# Patient Record
Sex: MALE | Race: WHITE | Employment: OTHER | URBAN - METROPOLITAN AREA
[De-identification: names, ages, dates, MRNs, and addresses within clinical notes are randomized per-mention and may not be internally consistent; named-entity substitution may affect disease eponyms.]

---

## 2023-12-04 ENCOUNTER — HOME HEALTH ADMISSION (OUTPATIENT)
Dept: HOME HEALTH SERVICES | Facility: HOME HEALTH | Age: 88
End: 2023-12-04
Payer: MEDICARE

## 2023-12-06 ENCOUNTER — HOME CARE VISIT (OUTPATIENT)
Facility: HOME HEALTH | Age: 88
End: 2023-12-06
Payer: MEDICARE

## 2023-12-06 VITALS
HEART RATE: 54 BPM | SYSTOLIC BLOOD PRESSURE: 110 MMHG | DIASTOLIC BLOOD PRESSURE: 64 MMHG | TEMPERATURE: 97.4 F | RESPIRATION RATE: 18 BRPM | OXYGEN SATURATION: 99 %

## 2023-12-06 PROCEDURE — G0299 HHS/HOSPICE OF RN EA 15 MIN: HCPCS

## 2023-12-06 ASSESSMENT — ENCOUNTER SYMPTOMS
PAIN LOCATION - PAIN QUALITY: CONSTANT
DYSPNEA ACTIVITY LEVEL: AFTER AMBULATING 10 - 20 FT

## 2023-12-06 NOTE — HOME HEALTH
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 Nursing and Physical Therapy needed for education on wound care, pressure injury prevention, medication regimen, HEP, home/equipment safety. Clinical Assessment/Skilled reason for admission to home health (What this means for the patient overall and need for ongoing skilled care): Riaz Celaya is a 80year old male who now lives with son, daughter-in-law and 2 dogs on one-story home with 3 CHRISTIE. PMHx. includes DM (does not check sugars), HTN, mixed hyperlipidemia, GERD, long term use of anticoagulant therapy, pacemaker (placed ), malignant neoplasm of stomach, and anxiety. Per documentation, patient had moved to [facility] from [place] about 8-9 years ago. Son states patient's wife (who had dementia) had recently fallen and broke hip, was hospitalized, couldn't walk again, and went to a nursing home. Patient was driving to visit wife daily and one day fell in lobby while visiting her. Patient was then taken to a local hospital in [facility]. Patient went to [facility] in [facility] and developed sepsis due to infection in leads of pacemaker. He was in ICU 2 weeks, transferred to regular unit, then went to rehab. Patient was then at a rehab facility in [place] for 3 weeks. He was discharged  from rehab and on  a [address] [facility] agency came out, at which time patient complained that his 'backside hurt' and the nurse found a stage 4 pressure ulcer on sacrum and son then took patient to ER the same day,  where patient was admitted, placed on vancomycin, discharged . Patient then then went home to [place] with son and on the way back found out patient's wife had passed away (son stated patient's wife's  is today ). Patient is now living with son and daughter-in-law. Son states states his mother-in-law is also moving in with them.  Patient has

## 2023-12-08 ENCOUNTER — HOME CARE VISIT (OUTPATIENT)
Facility: HOME HEALTH | Age: 88
End: 2023-12-08
Payer: MEDICARE

## 2023-12-08 VITALS
TEMPERATURE: 97.6 F | RESPIRATION RATE: 20 BRPM | DIASTOLIC BLOOD PRESSURE: 64 MMHG | HEART RATE: 60 BPM | SYSTOLIC BLOOD PRESSURE: 108 MMHG | OXYGEN SATURATION: 99 %

## 2023-12-08 PROCEDURE — G0299 HHS/HOSPICE OF RN EA 15 MIN: HCPCS

## 2023-12-08 ASSESSMENT — ENCOUNTER SYMPTOMS
DYSPNEA ACTIVITY LEVEL: AFTER AMBULATING MORE THAN 20 FT
STOOL DESCRIPTION: SOFT FORMED
PAIN LOCATION - PAIN QUALITY: DULL

## 2023-12-11 ENCOUNTER — HOME CARE VISIT (OUTPATIENT)
Facility: HOME HEALTH | Age: 88
End: 2023-12-11
Payer: MEDICARE

## 2023-12-11 VITALS
DIASTOLIC BLOOD PRESSURE: 70 MMHG | RESPIRATION RATE: 17 BRPM | TEMPERATURE: 97.5 F | SYSTOLIC BLOOD PRESSURE: 97 MMHG | HEART RATE: 77 BPM | OXYGEN SATURATION: 90 %

## 2023-12-11 PROCEDURE — G0151 HHCP-SERV OF PT,EA 15 MIN: HCPCS

## 2023-12-11 PROCEDURE — G0300 HHS/HOSPICE OF LPN EA 15 MIN: HCPCS

## 2023-12-11 ASSESSMENT — ENCOUNTER SYMPTOMS: PAIN LOCATION - PAIN QUALITY: ACHE

## 2023-12-12 VITALS
DIASTOLIC BLOOD PRESSURE: 60 MMHG | TEMPERATURE: 97.8 F | OXYGEN SATURATION: 97 % | HEART RATE: 72 BPM | SYSTOLIC BLOOD PRESSURE: 128 MMHG | RESPIRATION RATE: 18 BRPM

## 2023-12-12 ASSESSMENT — ENCOUNTER SYMPTOMS: STOOL DESCRIPTION: SOFT FORMED

## 2023-12-12 NOTE — HOME HEALTH
Subjective: Patient stated doing well, caregiver stated doing better and no more falls  Falls since last visit No(if yes complete the Fall Tracking Form and include bsrifallreport):   Caregiver involvement changes: Son present during visit  Home health supplies by type and quantity ordered/delivered this visit include: none    Clinician asked if patient has had any physician contact since last home care visit and patient states: NO  Clinician asked if patient has any new or changed medications and patient states:  NO   If Yes, were medications reconciled? N/A   Was the certifying physician notified of changes in medications? NO     Clinical assessment (what this visit means for the patient overall and need for ongoing skilled care) and progress or lack of progress towards SPECIFIC goals: Patient being seen for wound care, wound to sacrum and foream are free of infection, patient at risk for wound complications due to not changing positions when needed. Educated about changing positions every two hours to prevent any further breakdown and son was able to teach back. Educted about medication to include iron and furosimide, the side effects and also when to call the HH/ MD office with concerns, caregiver was able to teach back at 100%. Patient is progressing towards goals. Written Teaching Material Utilized: N/A    Interdisciplinary communication with: N/A     Discharge planning as follows: When wound is 100% healed, Per physician order and When goals are met    Specific plan for next visit: Reassess wound care and possible change if needed to debried the slough in sacral wound and change from daily.

## 2023-12-12 NOTE — HOME HEALTH
usually kept, Proper disposal Return unused medication to the pharmacy, if possible., Locate a medicine take-back option, mail back program or local collection receptable for drug disposal, if possible, May use a medication disposal pouch or container, if provided by the pharmacy. Follow the instructions to deactivate the medication, and discard in the trash.   and May mix the medication in dirt, cat litter, used coffee grounds or dish detergent and place the mixture in a container or plastic bag and seal. Discard the container in the trash. , Staying hydrated, Eating high fiber diet, Potential complications such as seizures, inability or difficulty rousing patient, slow or shallow breathing, slurred speech, blue lips or finger, confusion, inability to urinate and Side effects such as dizziness, sleepiness, constipation, nausea, vomiting, itchiness, and dry mouth  High alert medication teaching on Eliquis, anticoagulant therapy education; purpose, dose, and frequency, food/drug interactions, risks of co-administration with other medications such as ASA, NSAID, and herbals, bleeding prevention strategies such as the use of a soft toothbrush, gentle flossing, use of electric razor, on the potential for increased bleeding from falls and lacerations, to notify medical providers of anticoagulant therapy, consider carrying an ID card, signs and symptoms of abnormal bleeding such as unexplained bruising, dizziness/lightheadedness, red or tarry looking stool, blood in urine, blood in vomit and to call home care agency and/or physician for any problems or concerns  High alert medication teaching on doxycyline, Antibiotic therapy education Purpose, dose, and frequency, Complete course even if you feel better, Side effects such as nausea, vomiting, diarrhea, fungal (yeast) vaginal infections or oral thrush, Complications such as photosensitivity and rash and Severe allergic reaction, anaphylaxis, which requires immediate

## 2023-12-13 ENCOUNTER — HOME CARE VISIT (OUTPATIENT)
Facility: HOME HEALTH | Age: 88
End: 2023-12-13
Payer: MEDICARE

## 2023-12-13 PROCEDURE — G0299 HHS/HOSPICE OF RN EA 15 MIN: HCPCS

## 2023-12-14 ENCOUNTER — HOME CARE VISIT (OUTPATIENT)
Facility: HOME HEALTH | Age: 88
End: 2023-12-14
Payer: MEDICARE

## 2023-12-14 VITALS
DIASTOLIC BLOOD PRESSURE: 58 MMHG | HEART RATE: 75 BPM | OXYGEN SATURATION: 100 % | TEMPERATURE: 97 F | SYSTOLIC BLOOD PRESSURE: 110 MMHG

## 2023-12-14 PROCEDURE — G0157 HHC PT ASSISTANT EA 15: HCPCS

## 2023-12-14 NOTE — HOME HEALTH
Subjective: Patient states he has some pain in his sacrum  Falls since last visit: No (if yes complete the Fall Tracking Form and include bsrifallreport):   Caregiver involvement changes: n/a  Home health supplies by type and quantity ordered/delivered this visit include: Plurogel and gloves    Clinician asked if patient has had any physician contact since last home care visit and patient states: NO  Clinician asked if patient has any new or changed medications and patient states:  NO   If Yes, were medications reconciled? N/A   Was the certifying physician notified of changes in medications? N/A     Clinical assessment (what this visit means for the patient overall and need for ongoing skilled care) and progress or lack of progress towards SPECIFIC goals: Patient with stage 4 wound on sacrum requiring SN services for assessment, education and wound care to reduce risk for infection and rehospitalization. Patient progressing towards educational and wound care goals but not yet met. Written Teaching Material Utilized: N/A    Interdisciplinary communication with: care team for the purpose of POC collaboration    Discharge planning as follows:  When goals are met    Specific plan for next visit: Assessment, wound care

## 2023-12-14 NOTE — HOME HEALTH
Subjective: I am still trying to get settled. Falls since last visit No(if yes complete the Fall Tracking Form and include bsrifallreport):   Caregiver involvement changes: son present for visit  Home health supplies by type and quantity ordered/delivered this visit include: n/a    Clinician asked if patient has had any physician contact since last home care visit and patient states: No  Clinician asked if patient has any new or changed medications and patient states:  No  If Yes, were medications reconciled? NA  Was the certifying physician notified of changes in medications? NA    Clinical assessment (what this visit means for the patient overall and need for ongoing skilled care) and progress or lack of progress towards SPECIFIC goals: Focused on HEP instruction with pt and son during visit. Son and his wife have also moved wife's mother into their home this week so they will provide 24/7 care for them now. Pt instructed to have son S standing ex due to fall risk and utilize rollator for all amb at this time. Written Teaching Material Utilized: written HEP left in home    Interdisciplinary communication with: Adolfo Client PT for the purpose of POC collaboration    Discharge planning as follows:  Will discharge when the patient has reached their maximum functional potential and maximum safety in their home    Specific plan for next visit: review HEP and progress standing balance activities

## 2023-12-15 VITALS
OXYGEN SATURATION: 97 % | SYSTOLIC BLOOD PRESSURE: 112 MMHG | HEART RATE: 73 BPM | TEMPERATURE: 97.3 F | DIASTOLIC BLOOD PRESSURE: 68 MMHG

## 2023-12-22 ENCOUNTER — HOME CARE VISIT (OUTPATIENT)
Facility: HOME HEALTH | Age: 88
End: 2023-12-22
Payer: MEDICARE

## 2023-12-22 PROCEDURE — G0300 HHS/HOSPICE OF LPN EA 15 MIN: HCPCS

## 2023-12-25 ENCOUNTER — HOSPITAL ENCOUNTER (EMERGENCY)
Facility: HOSPITAL | Age: 88
Discharge: HOME | End: 2023-12-25
Attending: EMERGENCY MEDICINE
Payer: MEDICARE

## 2023-12-25 ENCOUNTER — APPOINTMENT (OUTPATIENT)
Facility: HOSPITAL | Age: 88
End: 2023-12-25
Payer: MEDICARE

## 2023-12-25 VITALS
BODY MASS INDEX: 24.11 KG/M2 | HEART RATE: 74 BPM | DIASTOLIC BLOOD PRESSURE: 73 MMHG | HEIGHT: 66 IN | TEMPERATURE: 97.8 F | SYSTOLIC BLOOD PRESSURE: 115 MMHG | OXYGEN SATURATION: 100 % | WEIGHT: 150 LBS | RESPIRATION RATE: 18 BRPM

## 2023-12-25 DIAGNOSIS — J81.0 ACUTE PULMONARY EDEMA (HCC): Primary | ICD-10-CM

## 2023-12-25 DIAGNOSIS — J18.9 COMMUNITY ACQUIRED PNEUMONIA OF RIGHT LOWER LOBE OF LUNG: ICD-10-CM

## 2023-12-25 LAB
ALBUMIN SERPL-MCNC: 2.9 G/DL (ref 3.5–5)
ALBUMIN/GLOB SERPL: 0.8 (ref 1.1–2.2)
ALP SERPL-CCNC: 101 U/L (ref 45–117)
ALT SERPL-CCNC: 18 U/L (ref 12–78)
ANION GAP SERPL CALC-SCNC: 2 MMOL/L (ref 5–15)
AST SERPL-CCNC: 17 U/L (ref 15–37)
BASOPHILS # BLD: 0 K/UL (ref 0–0.1)
BASOPHILS NFR BLD: 0 % (ref 0–1)
BILIRUB SERPL-MCNC: 0.4 MG/DL (ref 0.2–1)
BUN SERPL-MCNC: 34 MG/DL (ref 6–20)
BUN/CREAT SERPL: 29 (ref 12–20)
CALCIUM SERPL-MCNC: 9.3 MG/DL (ref 8.5–10.1)
CHLORIDE SERPL-SCNC: 106 MMOL/L (ref 97–108)
CO2 SERPL-SCNC: 31 MMOL/L (ref 21–32)
CREAT SERPL-MCNC: 1.18 MG/DL (ref 0.7–1.3)
DIFFERENTIAL METHOD BLD: ABNORMAL
EOSINOPHIL # BLD: 0 K/UL (ref 0–0.4)
EOSINOPHIL NFR BLD: 0 % (ref 0–7)
ERYTHROCYTE [DISTWIDTH] IN BLOOD BY AUTOMATED COUNT: 14.7 % (ref 11.5–14.5)
FLUAV AG NPH QL IA: NEGATIVE
FLUBV AG NOSE QL IA: NEGATIVE
GLOBULIN SER CALC-MCNC: 3.7 G/DL (ref 2–4)
GLUCOSE SERPL-MCNC: 95 MG/DL (ref 65–100)
HCT VFR BLD AUTO: 36.7 % (ref 36.6–50.3)
HGB BLD-MCNC: 11.1 G/DL (ref 12.1–17)
IMM GRANULOCYTES # BLD AUTO: 0 K/UL (ref 0–0.04)
IMM GRANULOCYTES NFR BLD AUTO: 1 % (ref 0–0.5)
LYMPHOCYTES # BLD: 1.4 K/UL (ref 0.8–3.5)
LYMPHOCYTES NFR BLD: 24 % (ref 12–49)
MAGNESIUM SERPL-MCNC: 2.4 MG/DL (ref 1.6–2.4)
MCH RBC QN AUTO: 28.5 PG (ref 26–34)
MCHC RBC AUTO-ENTMCNC: 30.2 G/DL (ref 30–36.5)
MCV RBC AUTO: 94.3 FL (ref 80–99)
MONOCYTES # BLD: 0.3 K/UL (ref 0–1)
MONOCYTES NFR BLD: 6 % (ref 5–13)
NEUTS SEG # BLD: 4.1 K/UL (ref 1.8–8)
NEUTS SEG NFR BLD: 69 % (ref 32–75)
NRBC # BLD: 0 K/UL (ref 0–0.01)
NRBC BLD-RTO: 0 PER 100 WBC
NT PRO BNP: ABNORMAL PG/ML
PLATELET # BLD AUTO: 192 K/UL (ref 150–400)
PMV BLD AUTO: 10.8 FL (ref 8.9–12.9)
POTASSIUM SERPL-SCNC: 5.3 MMOL/L (ref 3.5–5.1)
PROT SERPL-MCNC: 6.6 G/DL (ref 6.4–8.2)
RBC # BLD AUTO: 3.89 M/UL (ref 4.1–5.7)
SARS-COV-2 RDRP RESP QL NAA+PROBE: NOT DETECTED
SODIUM SERPL-SCNC: 139 MMOL/L (ref 136–145)
SOURCE: NORMAL
TROPONIN I SERPL HS-MCNC: 73 NG/L (ref 0–76)
WBC # BLD AUTO: 5.9 K/UL (ref 4.1–11.1)

## 2023-12-25 PROCEDURE — 85025 COMPLETE CBC W/AUTO DIFF WBC: CPT

## 2023-12-25 PROCEDURE — 99285 EMERGENCY DEPT VISIT HI MDM: CPT

## 2023-12-25 PROCEDURE — 71045 X-RAY EXAM CHEST 1 VIEW: CPT

## 2023-12-25 PROCEDURE — 87804 INFLUENZA ASSAY W/OPTIC: CPT

## 2023-12-25 PROCEDURE — 96374 THER/PROPH/DIAG INJ IV PUSH: CPT

## 2023-12-25 PROCEDURE — 83735 ASSAY OF MAGNESIUM: CPT

## 2023-12-25 PROCEDURE — 36415 COLL VENOUS BLD VENIPUNCTURE: CPT

## 2023-12-25 PROCEDURE — 93005 ELECTROCARDIOGRAM TRACING: CPT | Performed by: EMERGENCY MEDICINE

## 2023-12-25 PROCEDURE — 83880 ASSAY OF NATRIURETIC PEPTIDE: CPT

## 2023-12-25 PROCEDURE — 87635 SARS-COV-2 COVID-19 AMP PRB: CPT

## 2023-12-25 PROCEDURE — 80053 COMPREHEN METABOLIC PANEL: CPT

## 2023-12-25 PROCEDURE — 84484 ASSAY OF TROPONIN QUANT: CPT

## 2023-12-25 PROCEDURE — 6360000002 HC RX W HCPCS: Performed by: EMERGENCY MEDICINE

## 2023-12-25 RX ORDER — AZITHROMYCIN 250 MG/1
TABLET, FILM COATED ORAL
Qty: 1 PACKET | Refills: 0 | Status: SHIPPED | OUTPATIENT
Start: 2023-12-25 | End: 2023-12-29

## 2023-12-25 RX ORDER — FUROSEMIDE 10 MG/ML
20 INJECTION INTRAMUSCULAR; INTRAVENOUS ONCE
Status: COMPLETED | OUTPATIENT
Start: 2023-12-25 | End: 2023-12-25

## 2023-12-25 RX ORDER — FUROSEMIDE 20 MG/1
40 TABLET ORAL DAILY
Qty: 10 TABLET | Refills: 0 | Status: SHIPPED | OUTPATIENT
Start: 2023-12-26

## 2023-12-25 RX ADMIN — FUROSEMIDE 20 MG: 10 INJECTION, SOLUTION INTRAMUSCULAR; INTRAVENOUS at 14:31

## 2023-12-25 NOTE — ED NOTES
Discharge medications reviewed with the patient and caregiver/son and appropriate educational materials and side effects teaching were provided.

## 2023-12-25 NOTE — ED PROVIDER NOTES
conveys that all of his questions have been answered. I have also provided discharge instructions for him that include: educational information regarding their diagnosis and treatment, and list of reasons why they would want to return to the ED prior to their follow-up appointment, should his condition change. CLINICAL IMPRESSION    Discharge Note: The patient is stable for discharge home. The signs, symptoms, diagnosis, and discharge instructions have been discussed, understanding conveyed, and agreed upon. The patient is to follow up as recommended or return to ER should their symptoms worsen. PATIENT REFERRED TO:  [facility] EMERGENCY DEPT  [address] [place] [address] [address]  [phone]          See your doctor  In 2 days  As needed       DISCHARGE MEDICATIONS:     Medication List        START taking these medications      azithromycin 250 MG tablet  Commonly known as: Zithromax Z-Reji  Take 2 tablets (500 mg) on Day 1, and then take 1 tablet (250 mg) on days 2 through 5.             CHANGE how you take these medications      furosemide 20 MG tablet  Commonly known as: Lasix  Take 2 tablets by mouth daily Daily for 30 days  Start taking on: December 26, 2023  What changed: how much to take            ASK your doctor about these medications      amoxicillin-clavulanate 875-125 MG per tablet  Commonly known as: AUGMENTIN     doxycycline monohydrate 100 MG tablet  Commonly known as: ADOXA     Eliquis 5 MG Tabs tablet  Generic drug: apixaban     escitalopram 5 MG tablet  Commonly known as: LEXAPRO     Farxiga 10 MG tablet  Generic drug: dapagliflozin     ferrous sulfate 325 (65 Fe) MG tablet  Commonly known as: IRON 325     lovastatin 20 MG tablet  Commonly known as: MEVACOR     METOPROLOL SUCCINATE PO     nitroGLYCERIN 0.4 MG SL tablet  Commonly known as: NITROSTAT     pantoprazole 40 MG tablet  Commonly known as: PROTONIX     potassium chloride 10 MEQ extended release tablet  Commonly known as:

## 2023-12-26 ENCOUNTER — HOME CARE VISIT (OUTPATIENT)
Facility: HOME HEALTH | Age: 88
End: 2023-12-26
Payer: MEDICARE

## 2023-12-26 ENCOUNTER — APPOINTMENT (OUTPATIENT)
Facility: HOSPITAL | Age: 88
End: 2023-12-26
Payer: MEDICARE

## 2023-12-26 ENCOUNTER — HOSPITAL ENCOUNTER (EMERGENCY)
Facility: HOSPITAL | Age: 88
Discharge: HOME | End: 2023-12-26
Attending: EMERGENCY MEDICINE
Payer: MEDICARE

## 2023-12-26 VITALS
DIASTOLIC BLOOD PRESSURE: 63 MMHG | TEMPERATURE: 98 F | BODY MASS INDEX: 25.86 KG/M2 | WEIGHT: 160.94 LBS | SYSTOLIC BLOOD PRESSURE: 111 MMHG | HEIGHT: 66 IN | OXYGEN SATURATION: 98 % | HEART RATE: 73 BPM | RESPIRATION RATE: 19 BRPM

## 2023-12-26 DIAGNOSIS — R07.9 RECURRENT CHEST PAIN: ICD-10-CM

## 2023-12-26 DIAGNOSIS — R07.89 ATYPICAL CHEST PAIN: Primary | ICD-10-CM

## 2023-12-26 LAB
ALBUMIN SERPL-MCNC: 3 G/DL (ref 3.5–5)
ALBUMIN/GLOB SERPL: 0.8 (ref 1.1–2.2)
ALP SERPL-CCNC: 106 U/L (ref 45–117)
ALT SERPL-CCNC: 18 U/L (ref 12–78)
ANION GAP SERPL CALC-SCNC: 6 MMOL/L (ref 5–15)
AST SERPL-CCNC: 14 U/L (ref 15–37)
BASOPHILS # BLD: 0 K/UL (ref 0–0.1)
BASOPHILS NFR BLD: 1 % (ref 0–1)
BILIRUB SERPL-MCNC: 0.5 MG/DL (ref 0.2–1)
BUN SERPL-MCNC: 35 MG/DL (ref 6–20)
BUN/CREAT SERPL: 20 (ref 12–20)
CALCIUM SERPL-MCNC: 9.3 MG/DL (ref 8.5–10.1)
CHLORIDE SERPL-SCNC: 105 MMOL/L (ref 97–108)
CO2 SERPL-SCNC: 30 MMOL/L (ref 21–32)
CREAT SERPL-MCNC: 1.71 MG/DL (ref 0.7–1.3)
DIFFERENTIAL METHOD BLD: ABNORMAL
EKG ATRIAL RATE: 72 BPM
EKG ATRIAL RATE: 77 BPM
EKG DIAGNOSIS: NORMAL
EKG DIAGNOSIS: NORMAL
EKG P AXIS: 80 DEGREES
EKG P-R INTERVAL: 248 MS
EKG Q-T INTERVAL: 432 MS
EKG Q-T INTERVAL: 474 MS
EKG QRS DURATION: 122 MS
EKG QRS DURATION: 94 MS
EKG QTC CALCULATION (BAZETT): 488 MS
EKG QTC CALCULATION (BAZETT): 515 MS
EKG R AXIS: -23 DEGREES
EKG R AXIS: -66 DEGREES
EKG T AXIS: 116 DEGREES
EKG T AXIS: 124 DEGREES
EKG VENTRICULAR RATE: 71 BPM
EKG VENTRICULAR RATE: 77 BPM
EOSINOPHIL # BLD: 0 K/UL (ref 0–0.4)
EOSINOPHIL NFR BLD: 0 % (ref 0–7)
ERYTHROCYTE [DISTWIDTH] IN BLOOD BY AUTOMATED COUNT: 14.6 % (ref 11.5–14.5)
GLOBULIN SER CALC-MCNC: 3.8 G/DL (ref 2–4)
GLUCOSE SERPL-MCNC: 100 MG/DL (ref 65–100)
HCT VFR BLD AUTO: 41.1 % (ref 36.6–50.3)
HGB BLD-MCNC: 12.3 G/DL (ref 12.1–17)
IMM GRANULOCYTES # BLD AUTO: 0 K/UL (ref 0–0.04)
IMM GRANULOCYTES NFR BLD AUTO: 0 % (ref 0–0.5)
LYMPHOCYTES # BLD: 1.4 K/UL (ref 0.8–3.5)
LYMPHOCYTES NFR BLD: 26 % (ref 12–49)
MCH RBC QN AUTO: 28.2 PG (ref 26–34)
MCHC RBC AUTO-ENTMCNC: 29.9 G/DL (ref 30–36.5)
MCV RBC AUTO: 94.3 FL (ref 80–99)
MONOCYTES # BLD: 0.4 K/UL (ref 0–1)
MONOCYTES NFR BLD: 7 % (ref 5–13)
NEUTS SEG # BLD: 3.6 K/UL (ref 1.8–8)
NEUTS SEG NFR BLD: 66 % (ref 32–75)
NRBC # BLD: 0 K/UL (ref 0–0.01)
NRBC BLD-RTO: 0 PER 100 WBC
NT PRO BNP: ABNORMAL PG/ML
PLATELET # BLD AUTO: 188 K/UL (ref 150–400)
PMV BLD AUTO: 11 FL (ref 8.9–12.9)
POTASSIUM SERPL-SCNC: 4.7 MMOL/L (ref 3.5–5.1)
PROT SERPL-MCNC: 6.8 G/DL (ref 6.4–8.2)
RBC # BLD AUTO: 4.36 M/UL (ref 4.1–5.7)
SODIUM SERPL-SCNC: 141 MMOL/L (ref 136–145)
TROPONIN I SERPL HS-MCNC: 72 NG/L (ref 0–76)
TROPONIN I SERPL HS-MCNC: 87 NG/L (ref 0–76)
WBC # BLD AUTO: 5.4 K/UL (ref 4.1–11.1)

## 2023-12-26 PROCEDURE — 99285 EMERGENCY DEPT VISIT HI MDM: CPT

## 2023-12-26 PROCEDURE — 71275 CT ANGIOGRAPHY CHEST: CPT

## 2023-12-26 PROCEDURE — 80053 COMPREHEN METABOLIC PANEL: CPT

## 2023-12-26 PROCEDURE — 84484 ASSAY OF TROPONIN QUANT: CPT

## 2023-12-26 PROCEDURE — 96375 TX/PRO/DX INJ NEW DRUG ADDON: CPT

## 2023-12-26 PROCEDURE — A4216 STERILE WATER/SALINE, 10 ML: HCPCS | Performed by: EMERGENCY MEDICINE

## 2023-12-26 PROCEDURE — 36415 COLL VENOUS BLD VENIPUNCTURE: CPT

## 2023-12-26 PROCEDURE — 2580000003 HC RX 258: Performed by: EMERGENCY MEDICINE

## 2023-12-26 PROCEDURE — C9113 INJ PANTOPRAZOLE SODIUM, VIA: HCPCS | Performed by: EMERGENCY MEDICINE

## 2023-12-26 PROCEDURE — 6360000002 HC RX W HCPCS: Performed by: EMERGENCY MEDICINE

## 2023-12-26 PROCEDURE — 85025 COMPLETE CBC W/AUTO DIFF WBC: CPT

## 2023-12-26 PROCEDURE — 83880 ASSAY OF NATRIURETIC PEPTIDE: CPT

## 2023-12-26 PROCEDURE — 96374 THER/PROPH/DIAG INJ IV PUSH: CPT

## 2023-12-26 PROCEDURE — 6360000004 HC RX CONTRAST MEDICATION: Performed by: EMERGENCY MEDICINE

## 2023-12-26 RX ORDER — ONDANSETRON 2 MG/ML
4 INJECTION INTRAMUSCULAR; INTRAVENOUS ONCE
Status: COMPLETED | OUTPATIENT
Start: 2023-12-26 | End: 2023-12-26

## 2023-12-26 RX ORDER — FENTANYL CITRATE 50 UG/ML
25 INJECTION, SOLUTION INTRAMUSCULAR; INTRAVENOUS
Status: COMPLETED | OUTPATIENT
Start: 2023-12-26 | End: 2023-12-26

## 2023-12-26 RX ADMIN — IOPAMIDOL 100 ML: 755 INJECTION, SOLUTION INTRAVENOUS at 04:08

## 2023-12-26 RX ADMIN — FENTANYL CITRATE 25 MCG: 50 INJECTION INTRAMUSCULAR; INTRAVENOUS at 03:50

## 2023-12-26 RX ADMIN — PANTOPRAZOLE SODIUM 40 MG: 40 INJECTION, POWDER, FOR SOLUTION INTRAVENOUS at 06:34

## 2023-12-26 RX ADMIN — ONDANSETRON 4 MG: 2 INJECTION INTRAMUSCULAR; INTRAVENOUS at 03:25

## 2023-12-26 NOTE — ED PROVIDER NOTES
EMERGENCY DEPARTMENT HISTORY AND PHYSICAL EXAM     ----------------------------------------------------------------------------  Please note that this dictation was completed with [employer], the [employer] voice recognition software. Quite often unanticipated grammatical, syntax, homophones, and other interpretive errors are inadvertently transcribed by the computer software. Please disregard these errors. Please excuse any errors that have escaped final proofreading  ----------------------------------------------------------------------------      Date: 12/26/2023  Patient Name: Laurie Spivey     Chief Complaint   Patient presents with    Chest Pain     Reports  chest pain 4 hours ago from home, history of MI last 2010. History obtainted from:  Patient    Other independent source of history: EMS    HPI: Santiago Mayen is a 80 y.o. male, with significant pmhx of CHF, diabetes, who presents via EMS to the ED with c/o substernal crushing chest pain that started several hours ago while at rest.  Patient was reportedly here in the emergency department with similar complaints yesterday and underwent workup without significant findings including negative COVID and flu swab. PCP: Osmar Vasques MD    Allergy List:   No Known Allergies      Medications:  Current Facility-Administered Medications   Medication Dose Route Frequency Provider Last Rate Last Admin    pantoprazole (PROTONIX) 40 mg in sodium chloride (PF) 0.9 % 10 mL injection  40 mg IntraVENous Once Saúl Wilson MD         Current Outpatient Medications   Medication Sig Dispense Refill    furosemide (LASIX) 20 MG tablet Take 2 tablets by mouth daily Daily for 30 days 10 tablet 0    azithromycin (ZITHROMAX Z-BEATRIS) 250 MG tablet Take 2 tablets (500 mg) on Day 1, and then take 1 tablet (250 mg) on days 2 through 5. 1 packet 0    doxycycline monohydrate (ADOXA) 100 MG tablet Take 100 mg by mouth 2 times daily.  For 6 weeks

## 2023-12-26 NOTE — DISCHARGE INSTRUCTIONS
It was a pleasure taking care of you in our Emergency Department today. We know that when you come to [facility], you are entrusting us with your health, comfort, and safety. Our physicians and nurses honor that trust, and truly appreciate the opportunity to care for you and your loved ones. We also value your feedback. If you receive a survey about your Emergency Department experience today, please fill it out. We care about our patients' feedback, and we listen to what you have to say. Thank you!       Dr. Mynor [facility] MD.

## 2023-12-27 ENCOUNTER — HOME CARE VISIT (OUTPATIENT)
Facility: HOME HEALTH | Age: 88
End: 2023-12-27
Payer: MEDICARE

## 2023-12-27 VITALS
RESPIRATION RATE: 20 BRPM | SYSTOLIC BLOOD PRESSURE: 121 MMHG | HEART RATE: 68 BPM | TEMPERATURE: 97.3 F | OXYGEN SATURATION: 97 % | DIASTOLIC BLOOD PRESSURE: 72 MMHG

## 2023-12-27 PROCEDURE — G0300 HHS/HOSPICE OF LPN EA 15 MIN: HCPCS

## 2023-12-27 NOTE — HOME HEALTH
Arrived to pt's home for scheduled visit and pt's son had called to cancel visit as pt had been at ED for SOB twice in the past day. Pt had just woken up when PTA arrived and declined visit for today. Confirmed visit for Thursday with son and provided direct cell phone number for him to reach me.

## 2023-12-28 ENCOUNTER — HOME CARE VISIT (OUTPATIENT)
Facility: HOME HEALTH | Age: 88
End: 2023-12-28
Payer: MEDICARE

## 2023-12-28 VITALS
OXYGEN SATURATION: 98 % | DIASTOLIC BLOOD PRESSURE: 64 MMHG | SYSTOLIC BLOOD PRESSURE: 118 MMHG | HEART RATE: 68 BPM | WEIGHT: 149.2 LBS | TEMPERATURE: 97.5 F | BODY MASS INDEX: 24.08 KG/M2

## 2023-12-28 PROCEDURE — G0157 HHC PT ASSISTANT EA 15: HCPCS

## 2023-12-28 NOTE — HOME HEALTH
Subjective: Son reporting they have been weighing pt daily. Pt reporting breathing is alittle bit better. Falls since last visit No (if yes complete the Fall Tracking Form and include bsrifallreport):   Caregiver involvement changes: pt's son present for visit  Home health supplies by type and quantity ordered/delivered this visit include: n/a    Clinician asked if patient has had any physician contact since last home care visit and patient states: No  Clinician asked if patient has any new or changed medications and patient states:  No  If Yes, were medications reconciled? NA  Was the certifying physician notified of changes in medications? NA    Clinical assessment (what this visit means for the patient overall and need for ongoing skilled care) and progress or lack of progress towards SPECIFIC goals: Pt required increased seated rest breaks due to SOB but able to complete full strengthening program and progress balance activities to include activities on foam mat. Written Teaching Material Utilized: written HEP in home    Interdisciplinary communication with: Horacio Friend PT for the purpose of POC collaboration    Discharge planning as follows:  Will discharge when the patient has reached their maximum functional potential and maximum safety in their home    Specific plan for next visit: cont balance and LE strengthening ex

## 2023-12-29 ENCOUNTER — HOME CARE VISIT (OUTPATIENT)
Facility: HOME HEALTH | Age: 88
End: 2023-12-29
Payer: MEDICARE

## 2023-12-29 PROCEDURE — G0300 HHS/HOSPICE OF LPN EA 15 MIN: HCPCS

## 2024-01-01 ENCOUNTER — HOME CARE VISIT (OUTPATIENT)
Facility: HOME HEALTH | Age: 89
End: 2024-01-01
Payer: MEDICARE

## 2024-01-01 ENCOUNTER — TELEPHONE (OUTPATIENT)
Age: 89
End: 2024-01-01

## 2024-01-01 ENCOUNTER — HOME CARE VISIT (OUTPATIENT)
Age: 89
End: 2024-01-01
Payer: MEDICARE

## 2024-01-01 ENCOUNTER — HOSPICE ADMISSION (OUTPATIENT)
Age: 89
End: 2024-01-01
Payer: MEDICARE

## 2024-01-01 VITALS
RESPIRATION RATE: 20 BRPM | SYSTOLIC BLOOD PRESSURE: 98 MMHG | HEART RATE: 84 BPM | TEMPERATURE: 98.3 F | DIASTOLIC BLOOD PRESSURE: 54 MMHG

## 2024-01-01 VITALS
DIASTOLIC BLOOD PRESSURE: 72 MMHG | OXYGEN SATURATION: 99 % | SYSTOLIC BLOOD PRESSURE: 112 MMHG | RESPIRATION RATE: 24 BRPM | HEART RATE: 78 BPM

## 2024-01-01 VITALS
RESPIRATION RATE: 28 BRPM | DIASTOLIC BLOOD PRESSURE: 64 MMHG | SYSTOLIC BLOOD PRESSURE: 110 MMHG | HEART RATE: 80 BPM | OXYGEN SATURATION: 100 % | TEMPERATURE: 98.8 F

## 2024-01-01 PROCEDURE — G0156 HHCP-SVS OF AIDE,EA 15 MIN: HCPCS

## 2024-01-01 PROCEDURE — 0651 HSPC ROUTINE HOME CARE

## 2024-01-01 PROCEDURE — 2500000001 HSPC NON INJECTABLE MED

## 2024-01-01 PROCEDURE — G0299 HHS/HOSPICE OF RN EA 15 MIN: HCPCS

## 2024-01-01 PROCEDURE — 3331090004 HSPC SERVICE INTENSITY ADD-ON

## 2024-01-01 RX ADMIN — IPRATROPIUM BROMIDE AND ALBUTEROL SULFATE 1 VIAL: 2.5; .5 SOLUTION RESPIRATORY (INHALATION) at 10:30

## 2024-01-01 RX ADMIN — LORAZEPAM 2 MG: 2 CONCENTRATE ORAL at 09:00

## 2024-01-01 RX ADMIN — HYOSCYAMINE SULFATE 0.12 MG: 0.12 TABLET SUBLINGUAL at 10:45

## 2024-01-01 RX ADMIN — HYDROMORPHONE HYDROCHLORIDE 2 MG: 1 SOLUTION ORAL at 10:45

## 2024-01-01 ASSESSMENT — ENCOUNTER SYMPTOMS
SPUTUM PRODUCTION: 1
DYSPNEA ACTIVITY LEVEL: AT REST
SPUTUM CONSISTENCY: FROTHY
SPUTUM AMOUNT: MODERATE
DYSPNEA ACTIVITY LEVEL: AFTER AMBULATING LESS THAN 10 FT
SPUTUM COLOR: CLEAR

## 2024-01-02 ENCOUNTER — HOME CARE VISIT (OUTPATIENT)
Facility: HOME HEALTH | Age: 89
End: 2024-01-02
Payer: MEDICARE

## 2024-01-02 VITALS
DIASTOLIC BLOOD PRESSURE: 62 MMHG | WEIGHT: 148 LBS | TEMPERATURE: 97.1 F | HEART RATE: 70 BPM | SYSTOLIC BLOOD PRESSURE: 112 MMHG | BODY MASS INDEX: 23.89 KG/M2 | OXYGEN SATURATION: 95 %

## 2024-01-02 VITALS
HEART RATE: 78 BPM | SYSTOLIC BLOOD PRESSURE: 138 MMHG | RESPIRATION RATE: 18 BRPM | HEART RATE: 68 BPM | SYSTOLIC BLOOD PRESSURE: 128 MMHG | DIASTOLIC BLOOD PRESSURE: 71 MMHG | TEMPERATURE: 97.3 F | RESPIRATION RATE: 18 BRPM | OXYGEN SATURATION: 96 % | DIASTOLIC BLOOD PRESSURE: 73 MMHG | OXYGEN SATURATION: 96 % | TEMPERATURE: 97.5 F

## 2024-01-02 PROCEDURE — G0157 HHC PT ASSISTANT EA 15: HCPCS

## 2024-01-02 PROCEDURE — G0300 HHS/HOSPICE OF LPN EA 15 MIN: HCPCS

## 2024-01-02 ASSESSMENT — ENCOUNTER SYMPTOMS: HEMOPTYSIS: 0

## 2024-01-02 NOTE — HOME HEALTH
Subjective: Caregiver states they went to the Hospital twice this weekend   Falls since last visit No(if yes complete the Fall Tracking Form and include bsrifallreport):   Caregiver involvement changes: Pts son is primary caregiver   Home health supplies by type and quantity ordered/delivered this visit include: none     Clinician asked if patient has had any physician contact since last home care visit and patient states: NO  Clinician asked if patient has any new or changed medications and patient states:  NO   If Yes, were medications reconciled? NO   Was the certifying physician notified of changes in medications? NO     Clinical assessment (what this visit means for the patient overall and need for ongoing skilled care) and progress or lack of progress towards SPECIFIC goals: Pt continues to require SN for wound care, medication and diagnosis education, education on chf     Written Teaching Material Utilized: N/A    Interdisciplinary communication with: N/A for the purpose of NA     Discharge planning as follows: When wound is 100% healed    Specific plan for next visit: wound care

## 2024-01-02 NOTE — HOME HEALTH
Office Visit      History of present illness    Jian Mann is a 13 year old male who presents with possible allergic reaction to amoxicillin.  Rash on abdomen, arms and legs onset yesterday AM. Sometimes mildly itchy. Has had PCN/Amoxicillin in past. Rx'd Amoxcillin 8 days ago for strep throat and also had a viral URI. Had dose of Benadryl and hour or so ago--feels very tired. NO change in rash.     Historian was Patient and Mother    I have reviewed the past medical, family and social history sections including the medications and allergies listed in the above medical record.     Immunizations needed influenza.  Mother agrees to this today.      Review of systems    Review of symptoms as noted above in HPI (History of Present Illness).    Physical ExamINATION    Vital Signs:    Visit Vitals  /60   Temp 98.3 °F (36.8 °C) (Temporal Artery)   Wt 54.4 kg      Constitutional:  Alert, NAD (no acute distress).  HEET:  Normocephalic.  Atraumatic.  MMM (Mucous membranes moist), OP (oropharynx) clear,  B (bilateral) tympanic membranes clear.    Neck:  Normal range of motion.  No tenderness.  Supple.  No stridor.  No nuchal rigidity. Eyes:  PERRL (Pupils equal, round, reactive to light).  Conjunctivae normal.  No discharge.    Cardiovascular:  S1 and S2 normal.  No murmur.    Respiratory:  Normal breath sounds.  No wheezing.  No retractions.    Gastrointestinal:  Soft, nontender, non-distended, no HSM (hepatosplenomegaly).  Musculoskeletal:  No edema.  No tenderness.  No cyanosis.  No tenderness to palpation or major deformities noted.   Neurologic:  Alert and interactive.  No focal deficits noted.   Skin:  CR (Capillary refill) less than 2 seconds, fine papular rash, erythematous, blanchable--entire body.         Assessment         1. Amoxicillin rash: not allergy    2. Need for vaccination          PLAN    Given 7 days of abx and sxs completely clear and normal exam outside of rash, can be done with abx. Explained  Subjective: Caregiver states left foot is still swollen  Falls since last visit No(if yes complete the Fall Tracking Form and include bsrifallreport):   Caregiver involvement changes: Son is primary caregiver   Home health supplies by type and quantity ordered/delivered this visit include: none     Clinician asked if patient has had any physician contact since last home care visit and patient states: NO  Clinician asked if patient has any new or changed medications and patient states:  NO   If Yes, were medications reconciled? NO   Was the certifying physician notified of changes in medications? NO     Clinical assessment (what this visit means for the patient overall and need for ongoing skilled care) and progress or lack of progress towards SPECIFIC goals: Pt continues to require SN for wound care    Written Teaching Material Utilized: N/A    Interdisciplinary communication with: Dr. David made aware of increase in lasix    Discharge planning as follows: When wound is 100% healed    Specific plan for next visit: Wound care that this likely represents rash one gets when is on Amoxicillin and has a concurrent virus. Not to label as Amoxicillin allergic.  Orders Placed This Encounter   • Influenza Quadrivalent Split Pres Free 0.5 mL SDV ([id number])

## 2024-01-02 NOTE — HOME HEALTH
Subjective: Son reporting his mother in law is in hospital with URI so things have been hectic this weekend.  Pt reporting he is feeling better with balance.  Falls since last visit No(if yes complete the Fall Tracking Form and include bsrifallreport):   Caregiver involvement changes: pt's son present for visit  Home health supplies by type and quantity ordered/delivered this visit include: n/a    Clinician asked if patient has had any physician contact since last home care visit and patient states: no  Clinician asked if patient has any new or changed medications and patient states: no  If Yes, were medications reconciled? N/A   Was the certifying physician notified of changes in medications? N/A     Clinical assessment (what this visit means for the patient overall and need for ongoing skilled care) and progress or lack of progress towards SPECIFIC goals: Pt with improvement in standing balance and safety with amb using rollator.  Pt still attempting to amb short distances without AD but reminded pt that is unsafe and to bring rollator around to front of recliner or to the counter to provide stability until he reaches stable surface.    Written Teaching Material Utilized: written HEP in home    Interdisciplinary communication with: none at today's visit    Discharge planning as follows: Will discharge when the patient has reached their maximum functional potential and maximum safety in their home    Specific plan for next visit: stair training, amb on uneven surface

## 2024-01-04 ENCOUNTER — HOME CARE VISIT (OUTPATIENT)
Dept: HOME HEALTH SERVICES | Facility: HOME HEALTH | Age: 89
End: 2024-01-04
Payer: MEDICARE

## 2024-01-04 ENCOUNTER — HOME CARE VISIT (OUTPATIENT)
Facility: HOME HEALTH | Age: 89
End: 2024-01-04
Payer: MEDICARE

## 2024-01-04 VITALS
HEART RATE: 77 BPM | OXYGEN SATURATION: 95 % | TEMPERATURE: 97.6 F | SYSTOLIC BLOOD PRESSURE: 110 MMHG | DIASTOLIC BLOOD PRESSURE: 70 MMHG

## 2024-01-04 PROCEDURE — G0157 HHC PT ASSISTANT EA 15: HCPCS

## 2024-01-04 PROCEDURE — G0300 HHS/HOSPICE OF LPN EA 15 MIN: HCPCS

## 2024-01-08 ENCOUNTER — HOME CARE VISIT (OUTPATIENT)
Facility: HOME HEALTH | Age: 89
End: 2024-01-08
Payer: MEDICARE

## 2024-01-08 VITALS
TEMPERATURE: 97.8 F | DIASTOLIC BLOOD PRESSURE: 64 MMHG | SYSTOLIC BLOOD PRESSURE: 120 MMHG | HEART RATE: 60 BPM | OXYGEN SATURATION: 100 % | RESPIRATION RATE: 16 BRPM

## 2024-01-08 VITALS
RESPIRATION RATE: 16 BRPM | TEMPERATURE: 97.8 F | DIASTOLIC BLOOD PRESSURE: 64 MMHG | OXYGEN SATURATION: 100 % | HEART RATE: 60 BPM | SYSTOLIC BLOOD PRESSURE: 120 MMHG

## 2024-01-08 PROCEDURE — G0300 HHS/HOSPICE OF LPN EA 15 MIN: HCPCS

## 2024-01-08 PROCEDURE — G0151 HHCP-SERV OF PT,EA 15 MIN: HCPCS

## 2024-01-08 ASSESSMENT — ENCOUNTER SYMPTOMS
CONTUSION: 1
CONSTIPATION: 1

## 2024-01-08 NOTE — HOME HEALTH
Subjective: \"I'm alright.\"  Falls since last visit No(if yes complete the Fall Tracking Form and include bsrifallreport):   Caregiver involvement changes: No  Home health supplies by type and quantity ordered/delivered this visit include: 4x4 bordered foam dressings    Clinician asked if patient has had any physician contact since last home care visit and patient states: NO  Clinician asked if patient has any new or changed medications and patient states:  NO   If Yes, were medications reconciled? N/A   Was the certifying physician notified of changes in medications? N/A     Clinical assessment (what this visit means for the patient overall and need for ongoing skilled care) and progress or lack of progress towards SPECIFIC goals: Pt at risk for rehospitalization r/t fall risk, infection, wound exacerbation.  Wound healing goals not met.    Written Teaching Material Utilized: N/A    Interdisciplinary communication with: Emelia Martinez PT for the purpose of visit overlap    Discharge planning as follows: When wound is 100% healed    Specific plan for next visit: Assessment, wound care, education as needed

## 2024-01-09 ENCOUNTER — HOSPITAL ENCOUNTER (EMERGENCY)
Facility: HOSPITAL | Age: 89
Discharge: ANOTHER HOSPITAL | End: 2024-01-10
Attending: STUDENT IN AN ORGANIZED HEALTH CARE EDUCATION/TRAINING PROGRAM
Payer: MEDICARE

## 2024-01-09 ENCOUNTER — APPOINTMENT (OUTPATIENT)
Facility: HOSPITAL | Age: 89
End: 2024-01-09
Payer: MEDICARE

## 2024-01-09 DIAGNOSIS — R55 SYNCOPE AND COLLAPSE: ICD-10-CM
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
## 2024-01-09 DIAGNOSIS — S22.49XA MULTIPLE RIB FRACTURES INVOLVING FOUR OR MORE RIBS: Primary | ICD-10-CM

## 2024-01-09 DIAGNOSIS — S09.90XA CLOSED HEAD INJURY, INITIAL ENCOUNTER: ICD-10-CM

## 2024-01-09 LAB
BASOPHILS # BLD: 0 K/UL (ref 0–0.1)
BASOPHILS NFR BLD: 0 % (ref 0–1)
DIFFERENTIAL METHOD BLD: ABNORMAL
EOSINOPHIL # BLD: 0.2 K/UL (ref 0–0.4)
EOSINOPHIL NFR BLD: 3 % (ref 0–7)
ERYTHROCYTE [DISTWIDTH] IN BLOOD BY AUTOMATED COUNT: 14.1 % (ref 11.5–14.5)
HCT VFR BLD AUTO: 38.6 % (ref 36.6–50.3)
HGB BLD-MCNC: 11.6 G/DL (ref 12.1–17)
IMM GRANULOCYTES # BLD AUTO: 0 K/UL (ref 0–0.04)
IMM GRANULOCYTES NFR BLD AUTO: 1 % (ref 0–0.5)
LYMPHOCYTES # BLD: 2.3 K/UL (ref 0.8–3.5)
LYMPHOCYTES NFR BLD: 35 % (ref 12–49)
MCH RBC QN AUTO: 27.8 PG (ref 26–34)
MCHC RBC AUTO-ENTMCNC: 30.1 G/DL (ref 30–36.5)
MCV RBC AUTO: 92.6 FL (ref 80–99)
MONOCYTES # BLD: 0.4 K/UL (ref 0–1)
MONOCYTES NFR BLD: 6 % (ref 5–13)
NEUTS SEG # BLD: 3.5 K/UL (ref 1.8–8)
NEUTS SEG NFR BLD: 55 % (ref 32–75)
NRBC # BLD: 0 K/UL (ref 0–0.01)
NRBC BLD-RTO: 0 PER 100 WBC
PLATELET # BLD AUTO: 175 K/UL (ref 150–400)
PMV BLD AUTO: 11 FL (ref 8.9–12.9)
RBC # BLD AUTO: 4.17 M/UL (ref 4.1–5.7)
WBC # BLD AUTO: 6.4 K/UL (ref 4.1–11.1)

## 2024-01-09 PROCEDURE — 85610 PROTHROMBIN TIME: CPT

## 2024-01-09 PROCEDURE — 96374 THER/PROPH/DIAG INJ IV PUSH: CPT

## 2024-01-09 PROCEDURE — 86901 BLOOD TYPING SEROLOGIC RH(D): CPT

## 2024-01-09 PROCEDURE — 99285 EMERGENCY DEPT VISIT HI MDM: CPT

## 2024-01-09 PROCEDURE — 6360000002 HC RX W HCPCS: Performed by: STUDENT IN AN ORGANIZED HEALTH CARE EDUCATION/TRAINING PROGRAM

## 2024-01-09 PROCEDURE — 83735 ASSAY OF MAGNESIUM: CPT

## 2024-01-09 PROCEDURE — 81001 URINALYSIS AUTO W/SCOPE: CPT

## 2024-01-09 PROCEDURE — 86850 RBC ANTIBODY SCREEN: CPT

## 2024-01-09 PROCEDURE — 84484 ASSAY OF TROPONIN QUANT: CPT

## 2024-01-09 PROCEDURE — 6370000000 HC RX 637 (ALT 250 FOR IP): Performed by: STUDENT IN AN ORGANIZED HEALTH CARE EDUCATION/TRAINING PROGRAM

## 2024-01-09 PROCEDURE — 86900 BLOOD TYPING SEROLOGIC ABO: CPT

## 2024-01-09 PROCEDURE — 36415 COLL VENOUS BLD VENIPUNCTURE: CPT

## 2024-01-09 PROCEDURE — 80053 COMPREHEN METABOLIC PANEL: CPT

## 2024-01-09 PROCEDURE — 85025 COMPLETE CBC W/AUTO DIFF WBC: CPT

## 2024-01-09 PROCEDURE — 96375 TX/PRO/DX INJ NEW DRUG ADDON: CPT

## 2024-01-09 RX ORDER — ACETAMINOPHEN 500 MG
1000 TABLET ORAL
Status: COMPLETED | OUTPATIENT
Start: 2024-01-09 | End: 2024-01-09

## 2024-01-09 RX ORDER — FENTANYL CITRATE 50 UG/ML
25 INJECTION, SOLUTION INTRAMUSCULAR; INTRAVENOUS
Status: COMPLETED | OUTPATIENT
Start: 2024-01-09 | End: 2024-01-09

## 2024-01-09 RX ORDER — ONDANSETRON 2 MG/ML
4 INJECTION INTRAMUSCULAR; INTRAVENOUS ONCE
Status: COMPLETED | OUTPATIENT
Start: 2024-01-09 | End: 2024-01-09

## 2024-01-09 RX ADMIN — ONDANSETRON 4 MG: 2 INJECTION INTRAMUSCULAR; INTRAVENOUS at 23:45

## 2024-01-09 RX ADMIN — ACETAMINOPHEN 1000 MG: 500 TABLET ORAL at 23:48

## 2024-01-09 RX ADMIN — FENTANYL CITRATE 25 MCG: 50 INJECTION INTRAMUSCULAR; INTRAVENOUS at 23:49

## 2024-01-09 ASSESSMENT — PAIN SCALES - GENERAL: PAINLEVEL_OUTOF10: 5

## 2024-01-09 ASSESSMENT — PAIN - FUNCTIONAL ASSESSMENT: PAIN_FUNCTIONAL_ASSESSMENT: 0-10

## 2024-01-09 NOTE — HOME HEALTH
Subjective: \" I am working  hard on trying to get back to myself so I do not need so much help\"  Falls since last visit : no falls  Caregiver involvement changes: NA  Home health supplies by type and quantity ordered/delivered this visit include: NA    Clinician asked if patient has had any physician contact since last home care visit and patient states: no  Clinician asked if patient has any new or changed medications and patient states:  no  If Yes, were medications reconciled? yes  Was the certifying physician notified of changes in medications?NA    Clinical assessment (what this visit means for the patient overall and need for ongoing skilled care) and progress or lack of progress towards SPECIFIC goals:   The Pt is making some gains but his balance is still very poor and he has had a near fall with son in the MD Office. The pt needs to progress beyond bodyweight resistance for strengthening and move to progressive resistance with bands or weights to continue to build B LE Strength. The Pt is not having pain and SOB like was  a month ago. The pt is ambulating increased distances but still needs significant training on stairs to exit the home and then on uneven surfaces to access the car for medical treatment and at this time he is not safe to ambulate the stairs on his own and requires VC and hands on A and he is not safe to ambulate outside without cg and Rollator walker as the pt is at increased risk of falls. PT needed to continue to address balance, strengthening, gait and stair transfers to reduce pt risk for falls.     Written Teaching Material Utilized: HEP cue sheet    Interdisciplinary communication with: Jo Pichardo PTA for POC     Discharge planning as follows: Is no longer homebound, Per physician order, Will discharge when the patient has reached their maximum functional potential and maximum safety in their home and When goals are met    Specific plan for next visit: Re-instruct

## 2024-01-10 ENCOUNTER — APPOINTMENT (OUTPATIENT)
Facility: HOSPITAL | Age: 89
End: 2024-01-10
Payer: MEDICARE

## 2024-01-10 ENCOUNTER — HOME CARE VISIT (OUTPATIENT)
Facility: HOME HEALTH | Age: 89
End: 2024-01-10
Payer: MEDICARE

## 2024-01-10 ENCOUNTER — HOME CARE VISIT (OUTPATIENT)
Dept: HOME HEALTH SERVICES | Facility: HOME HEALTH | Age: 89
End: 2024-01-10
Payer: MEDICARE

## 2024-01-10 VITALS
SYSTOLIC BLOOD PRESSURE: 104 MMHG | HEART RATE: 70 BPM | RESPIRATION RATE: 23 BRPM | OXYGEN SATURATION: 91 % | HEIGHT: 66 IN | BODY MASS INDEX: 25.9 KG/M2 | TEMPERATURE: 97.5 F | DIASTOLIC BLOOD PRESSURE: 60 MMHG | WEIGHT: 161.16 LBS

## 2024-01-10 LAB
ABO + RH BLD: NORMAL
ALBUMIN SERPL-MCNC: 2.9 G/DL (ref 3.5–5)
ALBUMIN/GLOB SERPL: 0.8 (ref 1.1–2.2)
ALP SERPL-CCNC: 95 U/L (ref 45–117)
ALT SERPL-CCNC: 18 U/L (ref 12–78)
ANION GAP SERPL CALC-SCNC: 3 MMOL/L (ref 5–15)
APPEARANCE UR: CLEAR
AST SERPL-CCNC: 21 U/L (ref 15–37)
BACTERIA URNS QL MICRO: NEGATIVE /HPF
BILIRUB SERPL-MCNC: 0.5 MG/DL (ref 0.2–1)
BILIRUB UR QL: NEGATIVE
BLOOD GROUP ANTIBODIES SERPL: NORMAL
BUN SERPL-MCNC: 35 MG/DL (ref 6–20)
BUN/CREAT SERPL: 30 (ref 12–20)
CALCIUM SERPL-MCNC: 8.6 MG/DL (ref 8.5–10.1)
CHLORIDE SERPL-SCNC: 104 MMOL/L (ref 97–108)
CO2 SERPL-SCNC: 29 MMOL/L (ref 21–32)
COLOR UR: ABNORMAL
CREAT SERPL-MCNC: 1.16 MG/DL (ref 0.7–1.3)
EPITH CASTS URNS QL MICRO: ABNORMAL /LPF
GLOBULIN SER CALC-MCNC: 3.5 G/DL (ref 2–4)
GLUCOSE SERPL-MCNC: 97 MG/DL (ref 65–100)
GLUCOSE UR STRIP.AUTO-MCNC: 500 MG/DL
HGB UR QL STRIP: ABNORMAL
HYALINE CASTS URNS QL MICRO: ABNORMAL /LPF (ref 0–2)
INR PPP: 1.1 (ref 0.9–1.1)
KETONES UR QL STRIP.AUTO: NEGATIVE MG/DL
LEUKOCYTE ESTERASE UR QL STRIP.AUTO: ABNORMAL
MAGNESIUM SERPL-MCNC: 2.3 MG/DL (ref 1.6–2.4)
NITRITE UR QL STRIP.AUTO: NEGATIVE
PH UR STRIP: 6 (ref 5–8)
POTASSIUM SERPL-SCNC: 4.5 MMOL/L (ref 3.5–5.1)
PROT SERPL-MCNC: 6.4 G/DL (ref 6.4–8.2)
PROT UR STRIP-MCNC: ABNORMAL MG/DL
PROTHROMBIN TIME: 11.7 SEC (ref 9–11.1)
RBC #/AREA URNS HPF: ABNORMAL /HPF (ref 0–5)
SODIUM SERPL-SCNC: 136 MMOL/L (ref 136–145)
SP GR UR REFRACTOMETRY: 1.01
SPECIMEN EXP DATE BLD: NORMAL
TROPONIN I SERPL HS-MCNC: 68 NG/L (ref 0–76)
URINE CULTURE IF INDICATED: ABNORMAL
UROBILINOGEN UR QL STRIP.AUTO: 1 EU/DL (ref 0.2–1)
WBC URNS QL MICRO: ABNORMAL /HPF (ref 0–4)

## 2024-01-10 PROCEDURE — 6360000002 HC RX W HCPCS: Performed by: STUDENT IN AN ORGANIZED HEALTH CARE EDUCATION/TRAINING PROGRAM

## 2024-01-10 PROCEDURE — 71275 CT ANGIOGRAPHY CHEST: CPT

## 2024-01-10 PROCEDURE — 70450 CT HEAD/BRAIN W/O DYE: CPT

## 2024-01-10 PROCEDURE — 96375 TX/PRO/DX INJ NEW DRUG ADDON: CPT

## 2024-01-10 PROCEDURE — 2580000003 HC RX 258: Performed by: STUDENT IN AN ORGANIZED HEALTH CARE EDUCATION/TRAINING PROGRAM

## 2024-01-10 PROCEDURE — 72125 CT NECK SPINE W/O DYE: CPT

## 2024-01-10 PROCEDURE — 96361 HYDRATE IV INFUSION ADD-ON: CPT

## 2024-01-10 PROCEDURE — 6360000004 HC RX CONTRAST MEDICATION: Performed by: STUDENT IN AN ORGANIZED HEALTH CARE EDUCATION/TRAINING PROGRAM

## 2024-01-10 PROCEDURE — 74177 CT ABD & PELVIS W/CONTRAST: CPT

## 2024-01-10 PROCEDURE — 6370000000 HC RX 637 (ALT 250 FOR IP): Performed by: STUDENT IN AN ORGANIZED HEALTH CARE EDUCATION/TRAINING PROGRAM

## 2024-01-10 RX ORDER — LIDOCAINE 4 G/G
1 PATCH TOPICAL
Status: DISCONTINUED | OUTPATIENT
Start: 2024-01-10 | End: 2024-01-10 | Stop reason: HOSPADM

## 2024-01-10 RX ORDER — MORPHINE SULFATE 2 MG/ML
2 INJECTION, SOLUTION INTRAMUSCULAR; INTRAVENOUS
Status: COMPLETED | OUTPATIENT
Start: 2024-01-10 | End: 2024-01-10

## 2024-01-10 RX ORDER — 0.9 % SODIUM CHLORIDE 0.9 %
500 INTRAVENOUS SOLUTION INTRAVENOUS ONCE
Status: COMPLETED | OUTPATIENT
Start: 2024-01-10 | End: 2024-01-10

## 2024-01-10 RX ADMIN — IOPAMIDOL 100 ML: 755 INJECTION, SOLUTION INTRAVENOUS at 00:52

## 2024-01-10 RX ADMIN — MORPHINE SULFATE 2 MG: 2 INJECTION, SOLUTION INTRAMUSCULAR; INTRAVENOUS at 01:57

## 2024-01-10 RX ADMIN — SODIUM CHLORIDE 500 ML: 9 INJECTION, SOLUTION INTRAVENOUS at 01:57

## 2024-01-10 NOTE — HOME HEALTH
PTA driving to pt's home and received email stating at hospital after having a fall last night.  Spoke with son and he reported pt at [facility] and has 4 broken ribs.

## 2024-01-10 NOTE — ED PROVIDER NOTES
/lpf   TYPE AND SCREEN    Collection Time: 01/09/24 11:40 PM   Result Value Ref Range    Crossmatch expiration date 01/12/2024,2359     ABO/Rh O POSITIVE     Antibody Screen NEG            EKG: Atrial sensed, ventricular paced rhythm, heart rate of 76, no ST changes concerning for STEMI, narrow complex, EKG interpreted by me     RADIOLOGY:  Non-plain film images such as CT, Ultrasound and MRI are read by the radiologist. Plain radiographic images are visualized and preliminarily interpreted by the ED Provider with the below findings:        Interpretation per the Radiologist below, if available at the time of this note:     CT ABDOMEN PELVIS W IV CONTRAST Additional Contrast? None   Final Result   1.  No acute intra-abdominal pathology.      2.  Incidental findings as above      CT Head W/O Contrast   Final Result   Chronic small vessel ischemic disease. No acute intracranial abnormality.            CT CSpine W/O Contrast   Final Result   No evidence of fracture or subluxation      CTA CHEST W WO CONTRAST   Final Result   1.  Left-sided sixth through ninth rib fractures.      2.  Background of emphysema. Small amount of loculated fluid in the right major   fissure.      3.  Cardiomegaly. No vascular injury.               EMERGENCY DEPARTMENT COURSE and DIFFERENTIAL DIAGNOSIS/MDM   Vitals:    Vitals:    01/10/24 0016 01/10/24 0036 01/10/24 0046 01/10/24 0233   BP: (!) 106/53 (!) 96/50 (!) 94/50 (!) 94/53   Pulse: 70 70 70 70   Resp: 20 18 19 18   Temp:       TempSrc:       SpO2: 93% 94%     Weight:       Height:                Patient was given the following medications:  Medications   sodium chloride 0.9 % bolus 500 mL (500 mLs IntraVENous New Bag 1/10/24 0157)   lidocaine 4 % external patch 1 patch (1 patch TransDERmal Patch Applied 1/10/24 0157)   fentaNYL (SUBLIMAZE) injection 25 mcg (25 mcg IntraVENous Given 1/9/24 2349)   acetaminophen (TYLENOL) tablet 1,000 mg (1,000 mg Oral Given 1/9/24 2348)   ondansetron

## 2024-01-11 VITALS
SYSTOLIC BLOOD PRESSURE: 110 MMHG | OXYGEN SATURATION: 95 % | TEMPERATURE: 97.6 F | HEART RATE: 77 BPM | DIASTOLIC BLOOD PRESSURE: 70 MMHG | RESPIRATION RATE: 16 BRPM

## 2024-01-11 LAB
EKG ATRIAL RATE: 76 BPM
EKG DIAGNOSIS: NORMAL
EKG P AXIS: 81 DEGREES
EKG P-R INTERVAL: 226 MS
EKG Q-T INTERVAL: 424 MS
EKG QRS DURATION: 96 MS
EKG QTC CALCULATION (BAZETT): 477 MS
EKG R AXIS: -59 DEGREES
EKG T AXIS: 121 DEGREES
EKG VENTRICULAR RATE: 76 BPM

## 2024-01-11 ASSESSMENT — ENCOUNTER SYMPTOMS: HEMOPTYSIS: 0

## 2024-01-11 NOTE — HOME HEALTH
Please complete form for all falls whether observed or not observed.    Fall observed by HH Staff?No    Describe Event (please include location of fall and may copy and paste from visit note): Son reported to  over text message that patient fell while using the bathroom and fractured 4 ribs. He was sent to [facility] then transferred to [facility].     Document any re-training or treatment plan modifications indicated and the patient/caregiver response (may copy and paste from visit note):N/a    Assistive Devices used by patient prior to fall: Unknown      Was this equipment in use at time of fall? Unknown    Injury? Yes If yes, describe: fractured ribs    Emergent Care Received (EMS, )?YES, If yes, describe: EMS sent patient to [facility] then was transferred to [facility]       Was patient identified as High Risk prior to fall? YES

## 2024-01-11 NOTE — HOME HEALTH
Subjective: Son states pt has been doing well  Falls since last visit No(if yes complete the Fall Tracking Form and include bsrifallreport):   Caregiver involvement changes: Son and daughter in law are primary caregivers   Home health supplies by type and quantity ordered/delivered this visit include: none     Clinician asked if patient has had any physician contact since last home care visit and patient states: NO  Clinician asked if patient has any new or changed medications and patient states:  NO   If Yes, were medications reconciled? NO   Was the certifying physician notified of changes in medications? NO     Clinical assessment (what this visit means for the patient overall and need for ongoing skilled care) and progress or lack of progress towards SPECIFIC goals: Pt continues to require SN for wound care     Written Teaching Material Utilized: N/A    Interdisciplinary communication with: N/A for the purpose of NA     Discharge planning as follows: When wound is 100% healed    Specific plan for next visit: Wound care

## 2024-01-17 VITALS
RESPIRATION RATE: 18 BRPM | HEART RATE: 77 BPM | SYSTOLIC BLOOD PRESSURE: 110 MMHG | DIASTOLIC BLOOD PRESSURE: 70 MMHG | OXYGEN SATURATION: 95 % | TEMPERATURE: 97.6 F

## 2024-01-17 ASSESSMENT — ENCOUNTER SYMPTOMS: HEMOPTYSIS: 0

## 2024-01-17 NOTE — HOME HEALTH
Subjective: Son states pt has been doing better   Falls since last visit No(if yes complete the Fall Tracking Form and include bsrifallreport):   Caregiver involvement changes: Son is primary caregiver   Home health supplies by type and quantity ordered/delivered this visit include: none     Clinician asked if patient has had any physician contact since last home care visit and patient states: NO  Clinician asked if patient has any new or changed medications and patient states:  NO   If Yes, were medications reconciled? NO   Was the certifying physician notified of changes in medications? NO     Clinical assessment (what this visit means for the patient overall and need for ongoing skilled care) and progress or lack of progress towards SPECIFIC goals: Pt continues to require SN for wound care     Written Teaching Material Utilized: N/A    Interdisciplinary communication with: N/A for the purpose of NA     Discharge planning as follows: When wound is 100% healed    Specific plan for next visit: Wound care

## 2024-02-05 ENCOUNTER — HOME HEALTH ADMISSION (OUTPATIENT)
Dept: HOME HEALTH SERVICES | Facility: HOME HEALTH | Age: 89
End: 2024-02-05
Payer: MEDICARE

## 2024-02-06 ENCOUNTER — HOME CARE VISIT (OUTPATIENT)
Facility: HOME HEALTH | Age: 89
End: 2024-02-06
Payer: MEDICARE

## 2024-02-06 VITALS
OXYGEN SATURATION: 99 % | DIASTOLIC BLOOD PRESSURE: 64 MMHG | TEMPERATURE: 98.1 F | SYSTOLIC BLOOD PRESSURE: 118 MMHG | HEART RATE: 74 BPM | RESPIRATION RATE: 18 BRPM

## 2024-02-06 VITALS
SYSTOLIC BLOOD PRESSURE: 116 MMHG | OXYGEN SATURATION: 96 % | RESPIRATION RATE: 18 BRPM | TEMPERATURE: 97.7 F | DIASTOLIC BLOOD PRESSURE: 70 MMHG | HEART RATE: 65 BPM

## 2024-02-06 PROCEDURE — G0299 HHS/HOSPICE OF RN EA 15 MIN: HCPCS

## 2024-02-06 PROCEDURE — G0151 HHCP-SERV OF PT,EA 15 MIN: HCPCS

## 2024-02-06 ASSESSMENT — ENCOUNTER SYMPTOMS
PAIN LOCATION - PAIN QUALITY: CONSTANT
PAIN LOCATION - PAIN QUALITY: ACHING
DYSPNEA ACTIVITY LEVEL: WHILE SPEAKING
DYSPNEA ACTIVITY LEVEL: AFTER AMBULATING LESS THAN 10 FT

## 2024-02-06 NOTE — HOME HEALTH
Reason for referral, [facility] SUMMARY of clinical condition: Jack Hobbs admitted to home care for Pressure ulcer of sacral region, stage 4 . Nursing, Physical Therapy and Occupational Therapy needed for wound care, education on medication compliance, HEP.     Clinical Assessment/Skilled reason for admission to home health (What this means for the patient overall and need for ongoing skilled care): Jack Hobbs is a 89 year old  male who lives with son, daughter-in-law, and son's mother-in-law (who is currently at a rehab facility). Mr. Hobbs is s/p hospitalization 1/10 through 1/22 for Left 6-9 rib fractures following a GLF at home. Son states patient had gotten up in the middle of the night without calling, did not use walker and then fell, prompting hospital admission. He discharged to [facility] rehab facility, and was discharged home 2/5. PMHx. 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 Patient had previous hospitalization for infected leads in pacemaker wires for which he takes doxycycline twice daily. Son states he was told this antibiotic has to be taken indefinitely, but they plan to follow up with ID doctor to get this clarified. Patient ambulates in home with seated walker. He is short of breath with minimal exertion and unsafe ambulating due to generalized weakness and recent falls. Previously son had been taking daily weights and recording on a spread sheet, and will continue now that patient is home. During previous episode patient had skin tear to left arm which has now healed. He still has stage 4 pressure ulcer to sacrum, wound is pink/red with slough, measuring 1.5x1x1.5, some tunneling from 7 to 9 o'clock. Son educated on wet to dry dressings until new supplies arrive. Son states he eventually should be able to do WC, but

## 2024-02-08 ENCOUNTER — HOME CARE VISIT (OUTPATIENT)
Facility: HOME HEALTH | Age: 89
End: 2024-02-08
Payer: MEDICARE

## 2024-02-08 VITALS
DIASTOLIC BLOOD PRESSURE: 66 MMHG | OXYGEN SATURATION: 92 % | HEART RATE: 88 BPM | TEMPERATURE: 98.7 F | WEIGHT: 151 LBS | SYSTOLIC BLOOD PRESSURE: 120 MMHG | BODY MASS INDEX: 24.37 KG/M2 | RESPIRATION RATE: 16 BRPM

## 2024-02-08 VITALS
OXYGEN SATURATION: 97 % | TEMPERATURE: 97.8 F | DIASTOLIC BLOOD PRESSURE: 71 MMHG | RESPIRATION RATE: 22 BRPM | SYSTOLIC BLOOD PRESSURE: 111 MMHG | HEART RATE: 73 BPM

## 2024-02-08 PROCEDURE — G0152 HHCP-SERV OF OT,EA 15 MIN: HCPCS

## 2024-02-08 PROCEDURE — G0299 HHS/HOSPICE OF RN EA 15 MIN: HCPCS

## 2024-02-08 ASSESSMENT — ENCOUNTER SYMPTOMS: DYSPNEA ACTIVITY LEVEL: AT REST

## 2024-02-09 ENCOUNTER — HOSPITAL ENCOUNTER (INPATIENT)
Facility: HOSPITAL | Age: 89
LOS: 6 days | Discharge: HOME HEALTH | DRG: 291 | End: 2024-02-15
Attending: EMERGENCY MEDICINE | Admitting: STUDENT IN AN ORGANIZED HEALTH CARE EDUCATION/TRAINING PROGRAM
Payer: MEDICARE

## 2024-02-09 ENCOUNTER — APPOINTMENT (OUTPATIENT)
Facility: HOSPITAL | Age: 89
DRG: 291 | End: 2024-02-09
Payer: MEDICARE

## 2024-02-09 ENCOUNTER — HOME CARE VISIT (OUTPATIENT)
Dept: HOME HEALTH SERVICES | Facility: HOME HEALTH | Age: 89
End: 2024-02-09
Payer: MEDICARE

## 2024-02-09 ENCOUNTER — HOME CARE VISIT (OUTPATIENT)
Facility: HOME HEALTH | Age: 89
End: 2024-02-09
Payer: MEDICARE

## 2024-02-09 VITALS
RESPIRATION RATE: 17 BRPM | TEMPERATURE: 97.9 F | DIASTOLIC BLOOD PRESSURE: 78 MMHG | HEART RATE: 57 BPM | OXYGEN SATURATION: 88 % | SYSTOLIC BLOOD PRESSURE: 130 MMHG

## 2024-02-09 DIAGNOSIS — I50.43 CHF (CONGESTIVE HEART FAILURE), NYHA CLASS I, ACUTE ON CHRONIC, COMBINED (HCC): ICD-10-CM

## 2024-02-09 DIAGNOSIS — R06.03 ACUTE RESPIRATORY DISTRESS: Primary | ICD-10-CM

## 2024-02-09 DIAGNOSIS — J81.0 ACUTE PULMONARY EDEMA (HCC): ICD-10-CM

## 2024-02-09 DIAGNOSIS — U07.1 COVID-19: ICD-10-CM

## 2024-02-09 LAB
ALBUMIN SERPL-MCNC: 3.1 G/DL (ref 3.5–5)
ALBUMIN/GLOB SERPL: 1 (ref 1.1–2.2)
ALP SERPL-CCNC: 136 U/L (ref 45–117)
ALT SERPL-CCNC: 27 U/L (ref 12–78)
ANION GAP BLD CALC-SCNC: 10 (ref 10–20)
ANION GAP SERPL CALC-SCNC: 4 MMOL/L (ref 5–15)
APPEARANCE UR: CLEAR
AST SERPL-CCNC: 15 U/L (ref 15–37)
BACTERIA URNS QL MICRO: NEGATIVE /HPF
BASE EXCESS BLD CALC-SCNC: 4 MMOL/L
BASOPHILS # BLD: 0 K/UL (ref 0–0.1)
BASOPHILS NFR BLD: 0 % (ref 0–1)
BILIRUB SERPL-MCNC: 0.5 MG/DL (ref 0.2–1)
BILIRUB UR QL: NEGATIVE
BUN SERPL-MCNC: 27 MG/DL (ref 6–20)
BUN/CREAT SERPL: 25 (ref 12–20)
CA-I BLD-MCNC: 1.23 MMOL/L (ref 1.12–1.32)
CALCIUM SERPL-MCNC: 8.8 MG/DL (ref 8.5–10.1)
CHLORIDE BLD-SCNC: 105 MMOL/L (ref 100–108)
CHLORIDE SERPL-SCNC: 108 MMOL/L (ref 97–108)
CO2 BLD-SCNC: 31 MMOL/L (ref 19–24)
CO2 SERPL-SCNC: 33 MMOL/L (ref 21–32)
COLOR UR: ABNORMAL
CREAT SERPL-MCNC: 1.06 MG/DL (ref 0.7–1.3)
CREAT UR-MCNC: 0.9 MG/DL (ref 0.6–1.3)
DIFFERENTIAL METHOD BLD: ABNORMAL
EOSINOPHIL # BLD: 0 K/UL (ref 0–0.4)
EOSINOPHIL NFR BLD: 0 % (ref 0–7)
EPITH CASTS URNS QL MICRO: ABNORMAL /LPF
ERYTHROCYTE [DISTWIDTH] IN BLOOD BY AUTOMATED COUNT: 13.7 % (ref 11.5–14.5)
GLOBULIN SER CALC-MCNC: 3.1 G/DL (ref 2–4)
GLUCOSE BLD STRIP.AUTO-MCNC: 101 MG/DL (ref 65–117)
GLUCOSE BLD STRIP.AUTO-MCNC: 105 MG/DL (ref 65–117)
GLUCOSE BLD STRIP.AUTO-MCNC: 130 MG/DL (ref 65–117)
GLUCOSE BLD STRIP.AUTO-MCNC: 141 MG/DL (ref 74–106)
GLUCOSE SERPL-MCNC: 151 MG/DL (ref 65–100)
GLUCOSE UR STRIP.AUTO-MCNC: NEGATIVE MG/DL
HCO3 BLDA-SCNC: 31 MMOL/L
HCT VFR BLD AUTO: 40.9 % (ref 36.6–50.3)
HGB BLD-MCNC: 12.3 G/DL (ref 12.1–17)
HGB UR QL STRIP: ABNORMAL
HYALINE CASTS URNS QL MICRO: ABNORMAL /LPF (ref 0–2)
IMM GRANULOCYTES # BLD AUTO: 0 K/UL (ref 0–0.04)
IMM GRANULOCYTES NFR BLD AUTO: 1 % (ref 0–0.5)
KETONES UR QL STRIP.AUTO: NEGATIVE MG/DL
LACTATE BLD-SCNC: 2.19 MMOL/L (ref 0.4–2)
LACTATE BLD-SCNC: 2.73 MMOL/L (ref 0.4–2)
LEUKOCYTE ESTERASE UR QL STRIP.AUTO: NEGATIVE
LYMPHOCYTES # BLD: 1.2 K/UL (ref 0.8–3.5)
LYMPHOCYTES NFR BLD: 23 % (ref 12–49)
MCH RBC QN AUTO: 27.8 PG (ref 26–34)
MCHC RBC AUTO-ENTMCNC: 30.1 G/DL (ref 30–36.5)
MCV RBC AUTO: 92.3 FL (ref 80–99)
MONOCYTES # BLD: 0.3 K/UL (ref 0–1)
MONOCYTES NFR BLD: 5 % (ref 5–13)
NEUTS SEG # BLD: 3.8 K/UL (ref 1.8–8)
NEUTS SEG NFR BLD: 71 % (ref 32–75)
NITRITE UR QL STRIP.AUTO: NEGATIVE
NRBC # BLD: 0 K/UL (ref 0–0.01)
NRBC BLD-RTO: 0 PER 100 WBC
NT PRO BNP: ABNORMAL PG/ML
PCO2 BLDV: 57.9 MMHG (ref 41–51)
PH BLDV: 7.34 (ref 7.32–7.42)
PH UR STRIP: 7.5 (ref 5–8)
PLATELET # BLD AUTO: 171 K/UL (ref 150–400)
PMV BLD AUTO: 11.3 FL (ref 8.9–12.9)
PO2 BLDV: 28 MMHG (ref 25–40)
POTASSIUM BLD-SCNC: 3.6 MMOL/L (ref 3.5–5.5)
POTASSIUM SERPL-SCNC: 3.6 MMOL/L (ref 3.5–5.1)
PROCALCITONIN SERPL-MCNC: <0.05 NG/ML
PROT SERPL-MCNC: 6.2 G/DL (ref 6.4–8.2)
PROT UR STRIP-MCNC: NEGATIVE MG/DL
RBC # BLD AUTO: 4.43 M/UL (ref 4.1–5.7)
RBC #/AREA URNS HPF: ABNORMAL /HPF (ref 0–5)
SAO2 % BLD: 46 %
SARS-COV-2 RDRP RESP QL NAA+PROBE: DETECTED
SERVICE CMNT-IMP: ABNORMAL
SERVICE CMNT-IMP: ABNORMAL
SERVICE CMNT-IMP: NORMAL
SERVICE CMNT-IMP: NORMAL
SODIUM BLD-SCNC: 146 MMOL/L (ref 136–145)
SODIUM SERPL-SCNC: 145 MMOL/L (ref 136–145)
SOURCE: ABNORMAL
SP GR UR REFRACTOMETRY: 1.01
SPECIMEN SITE: ABNORMAL
TROPONIN I SERPL HS-MCNC: 116 NG/L (ref 0–76)
TROPONIN I SERPL HS-MCNC: 118 NG/L (ref 0–76)
URINE CULTURE IF INDICATED: ABNORMAL
UROBILINOGEN UR QL STRIP.AUTO: 0.2 EU/DL (ref 0.2–1)
WBC # BLD AUTO: 5.3 K/UL (ref 4.1–11.1)
WBC URNS QL MICRO: ABNORMAL /HPF (ref 0–4)

## 2024-02-09 PROCEDURE — 84145 PROCALCITONIN (PCT): CPT

## 2024-02-09 PROCEDURE — 6360000002 HC RX W HCPCS: Performed by: EMERGENCY MEDICINE

## 2024-02-09 PROCEDURE — G0151 HHCP-SERV OF PT,EA 15 MIN: HCPCS

## 2024-02-09 PROCEDURE — 80053 COMPREHEN METABOLIC PANEL: CPT

## 2024-02-09 PROCEDURE — 84484 ASSAY OF TROPONIN QUANT: CPT

## 2024-02-09 PROCEDURE — 82962 GLUCOSE BLOOD TEST: CPT

## 2024-02-09 PROCEDURE — 84132 ASSAY OF SERUM POTASSIUM: CPT

## 2024-02-09 PROCEDURE — 99285 EMERGENCY DEPT VISIT HI MDM: CPT

## 2024-02-09 PROCEDURE — 1100000003 HC PRIVATE W/ TELEMETRY

## 2024-02-09 PROCEDURE — 96374 THER/PROPH/DIAG INJ IV PUSH: CPT

## 2024-02-09 PROCEDURE — 84295 ASSAY OF SERUM SODIUM: CPT

## 2024-02-09 PROCEDURE — 85025 COMPLETE CBC W/AUTO DIFF WBC: CPT

## 2024-02-09 PROCEDURE — 6360000002 HC RX W HCPCS: Performed by: STUDENT IN AN ORGANIZED HEALTH CARE EDUCATION/TRAINING PROGRAM

## 2024-02-09 PROCEDURE — 71045 X-RAY EXAM CHEST 1 VIEW: CPT

## 2024-02-09 PROCEDURE — 82803 BLOOD GASES ANY COMBINATION: CPT

## 2024-02-09 PROCEDURE — 83036 HEMOGLOBIN GLYCOSYLATED A1C: CPT

## 2024-02-09 PROCEDURE — 87040 BLOOD CULTURE FOR BACTERIA: CPT

## 2024-02-09 PROCEDURE — 93005 ELECTROCARDIOGRAM TRACING: CPT | Performed by: EMERGENCY MEDICINE

## 2024-02-09 PROCEDURE — 83880 ASSAY OF NATRIURETIC PEPTIDE: CPT

## 2024-02-09 PROCEDURE — 82330 ASSAY OF CALCIUM: CPT

## 2024-02-09 PROCEDURE — 87635 SARS-COV-2 COVID-19 AMP PRB: CPT

## 2024-02-09 PROCEDURE — 6370000000 HC RX 637 (ALT 250 FOR IP): Performed by: STUDENT IN AN ORGANIZED HEALTH CARE EDUCATION/TRAINING PROGRAM

## 2024-02-09 PROCEDURE — 81001 URINALYSIS AUTO W/SCOPE: CPT

## 2024-02-09 PROCEDURE — 82947 ASSAY GLUCOSE BLOOD QUANT: CPT

## 2024-02-09 PROCEDURE — 36415 COLL VENOUS BLD VENIPUNCTURE: CPT

## 2024-02-09 PROCEDURE — 83605 ASSAY OF LACTIC ACID: CPT

## 2024-02-09 PROCEDURE — 2580000003 HC RX 258: Performed by: STUDENT IN AN ORGANIZED HEALTH CARE EDUCATION/TRAINING PROGRAM

## 2024-02-09 RX ORDER — SODIUM CHLORIDE 0.9 % (FLUSH) 0.9 %
5-40 SYRINGE (ML) INJECTION EVERY 12 HOURS SCHEDULED
Status: DISCONTINUED | OUTPATIENT
Start: 2024-02-09 | End: 2024-02-15 | Stop reason: HOSPADM

## 2024-02-09 RX ORDER — ONDANSETRON 2 MG/ML
4 INJECTION INTRAMUSCULAR; INTRAVENOUS EVERY 6 HOURS PRN
Status: DISCONTINUED | OUTPATIENT
Start: 2024-02-09 | End: 2024-02-15 | Stop reason: HOSPADM

## 2024-02-09 RX ORDER — METOPROLOL SUCCINATE 25 MG/1
12.5 TABLET, EXTENDED RELEASE ORAL DAILY
Status: DISCONTINUED | OUTPATIENT
Start: 2024-02-10 | End: 2024-02-15 | Stop reason: HOSPADM

## 2024-02-09 RX ORDER — METOPROLOL SUCCINATE 25 MG/1
12.5 TABLET, EXTENDED RELEASE ORAL DAILY
COMMUNITY

## 2024-02-09 RX ORDER — LIDOCAINE 4 G/G
1 PATCH TOPICAL DAILY
Status: DISCONTINUED | OUTPATIENT
Start: 2024-02-09 | End: 2024-02-15 | Stop reason: HOSPADM

## 2024-02-09 RX ORDER — ACETAMINOPHEN 325 MG/1
650 TABLET ORAL EVERY 6 HOURS PRN
Status: DISCONTINUED | OUTPATIENT
Start: 2024-02-09 | End: 2024-02-15 | Stop reason: HOSPADM

## 2024-02-09 RX ORDER — POLYETHYLENE GLYCOL 3350 17 G/17G
17 POWDER, FOR SOLUTION ORAL DAILY PRN
Status: DISCONTINUED | OUTPATIENT
Start: 2024-02-09 | End: 2024-02-15 | Stop reason: HOSPADM

## 2024-02-09 RX ORDER — ESCITALOPRAM OXALATE 10 MG/1
5 TABLET ORAL DAILY
Status: DISCONTINUED | OUTPATIENT
Start: 2024-02-10 | End: 2024-02-15 | Stop reason: HOSPADM

## 2024-02-09 RX ORDER — ALBUTEROL SULFATE 2.5 MG/3ML
2.5 SOLUTION RESPIRATORY (INHALATION) EVERY 6 HOURS PRN
Status: DISCONTINUED | OUTPATIENT
Start: 2024-02-09 | End: 2024-02-15 | Stop reason: HOSPADM

## 2024-02-09 RX ORDER — FERROUS SULFATE 325(65) MG
325 TABLET ORAL EVERY OTHER DAY
Status: DISCONTINUED | OUTPATIENT
Start: 2024-02-09 | End: 2024-02-15 | Stop reason: HOSPADM

## 2024-02-09 RX ORDER — DEXTROSE MONOHYDRATE 100 MG/ML
INJECTION, SOLUTION INTRAVENOUS CONTINUOUS PRN
Status: DISCONTINUED | OUTPATIENT
Start: 2024-02-09 | End: 2024-02-15 | Stop reason: HOSPADM

## 2024-02-09 RX ORDER — SODIUM CHLORIDE 9 MG/ML
INJECTION, SOLUTION INTRAVENOUS PRN
Status: DISCONTINUED | OUTPATIENT
Start: 2024-02-09 | End: 2024-02-15 | Stop reason: HOSPADM

## 2024-02-09 RX ORDER — INSULIN LISPRO 100 [IU]/ML
0-4 INJECTION, SOLUTION INTRAVENOUS; SUBCUTANEOUS
Status: DISCONTINUED | OUTPATIENT
Start: 2024-02-09 | End: 2024-02-15 | Stop reason: HOSPADM

## 2024-02-09 RX ORDER — ACETAMINOPHEN 325 MG/1
650 TABLET ORAL EVERY 4 HOURS PRN
Status: DISCONTINUED | OUTPATIENT
Start: 2024-02-09 | End: 2024-02-11 | Stop reason: SDUPTHER

## 2024-02-09 RX ORDER — ATORVASTATIN CALCIUM 10 MG/1
10 TABLET, FILM COATED ORAL DAILY
Status: DISCONTINUED | OUTPATIENT
Start: 2024-02-09 | End: 2024-02-15 | Stop reason: HOSPADM

## 2024-02-09 RX ORDER — INSULIN LISPRO 100 [IU]/ML
0-4 INJECTION, SOLUTION INTRAVENOUS; SUBCUTANEOUS NIGHTLY
Status: DISCONTINUED | OUTPATIENT
Start: 2024-02-09 | End: 2024-02-15 | Stop reason: HOSPADM

## 2024-02-09 RX ORDER — ACETAMINOPHEN 650 MG/1
650 SUPPOSITORY RECTAL EVERY 6 HOURS PRN
Status: DISCONTINUED | OUTPATIENT
Start: 2024-02-09 | End: 2024-02-15 | Stop reason: HOSPADM

## 2024-02-09 RX ORDER — SODIUM CHLORIDE 0.9 % (FLUSH) 0.9 %
5-40 SYRINGE (ML) INJECTION PRN
Status: DISCONTINUED | OUTPATIENT
Start: 2024-02-09 | End: 2024-02-15 | Stop reason: HOSPADM

## 2024-02-09 RX ORDER — DOXYCYCLINE HYCLATE 100 MG
100 TABLET ORAL 2 TIMES DAILY
Status: DISCONTINUED | OUTPATIENT
Start: 2024-02-09 | End: 2024-02-15 | Stop reason: HOSPADM

## 2024-02-09 RX ORDER — ONDANSETRON 4 MG/1
4 TABLET, ORALLY DISINTEGRATING ORAL EVERY 8 HOURS PRN
Status: DISCONTINUED | OUTPATIENT
Start: 2024-02-09 | End: 2024-02-15 | Stop reason: HOSPADM

## 2024-02-09 RX ORDER — FUROSEMIDE 10 MG/ML
40 INJECTION INTRAMUSCULAR; INTRAVENOUS 2 TIMES DAILY
Status: DISCONTINUED | OUTPATIENT
Start: 2024-02-09 | End: 2024-02-15 | Stop reason: HOSPADM

## 2024-02-09 RX ORDER — PANTOPRAZOLE SODIUM 40 MG/1
40 TABLET, DELAYED RELEASE ORAL 2 TIMES DAILY
Status: DISCONTINUED | OUTPATIENT
Start: 2024-02-09 | End: 2024-02-15 | Stop reason: HOSPADM

## 2024-02-09 RX ORDER — GLUCAGON 1 MG/ML
1 KIT INJECTION PRN
Status: DISCONTINUED | OUTPATIENT
Start: 2024-02-09 | End: 2024-02-15 | Stop reason: HOSPADM

## 2024-02-09 RX ORDER — LIDOCAINE 50 MG/G
1 PATCH TOPICAL DAILY
COMMUNITY

## 2024-02-09 RX ORDER — FUROSEMIDE 10 MG/ML
80 INJECTION INTRAMUSCULAR; INTRAVENOUS
Status: COMPLETED | OUTPATIENT
Start: 2024-02-09 | End: 2024-02-09

## 2024-02-09 RX ADMIN — FUROSEMIDE 80 MG: 10 INJECTION, SOLUTION INTRAMUSCULAR; INTRAVENOUS at 10:44

## 2024-02-09 RX ADMIN — DOXYCYCLINE HYCLATE 100 MG: 100 TABLET, FILM COATED ORAL at 20:52

## 2024-02-09 RX ADMIN — SODIUM CHLORIDE, PRESERVATIVE FREE 10 ML: 5 INJECTION INTRAVENOUS at 20:52

## 2024-02-09 RX ADMIN — FUROSEMIDE 40 MG: 10 INJECTION, SOLUTION INTRAMUSCULAR; INTRAVENOUS at 19:28

## 2024-02-09 RX ADMIN — ACETAMINOPHEN 650 MG: 325 TABLET ORAL at 19:34

## 2024-02-09 NOTE — HOME HEALTH
Subjective: Patient states he does have pain on his buttocks at times.  Falls since last visit No(if yes complete the Fall Tracking Form and include bsrifallreport):   Caregiver involvement changes: no  Home health supplies by type and quantity ordered/delivered this visit include: no    Clinician asked if patient has had any physician contact since last home care visit and patient states: N/A  Clinician asked if patient has any new or changed medications and patient states:  N/A   If Yes, were medications reconciled? N/A   Was the certifying physician notified of changes in medications? N/A     Clinical assessment (what this visit means for the patient overall and need for ongoing skilled care) and progress or lack of progress towards SPECIFIC goals: Patients wound care is complete. SN needed for further eval and treatment.    Written Teaching Material Utilized: N/A    Interdisciplinary communication with: N/A     Discharge planning as follows: When goals are met    Specific plan for next visit: Instruct caregiver/patient in eating more protien

## 2024-02-09 NOTE — HOME HEALTH
Subjective: \" I do not feel good. I cannot breathe\"  Falls since last visit : no  Caregiver involvement changes: CHOCO  Home health supplies by type and quantity ordered/delivered this visit include: CHOCO  Clinician asked if patient has had any physician contact since last home care visit and patient states: No  Clinician asked if patient has any new or changed medications and patient states:  No  If Yes, were medications reconciled? yes  Was the certifying physician notified of changes in medications? CHOCO   and had been up most of night with SOB. The PT   Clinical assessment (what this visit means for the patient overall and need for ongoing skilled care) and progress or lack of progress towards SPECIFIC goals:   When the PT arrived, the pt was not feeling well and had gotten up in the middle of the night SOB. He has been getting up in the middle of the night for three nights in a row with increased SOB each night. The PT checked the pt's vital and his O2 is 88% at rest but all other vitals are WNL. The pt was struggling to breath and was wheezing so the PT suggested they take the pt to the ER. Pt's son called EMS who took him to [facility].     Written Teaching Material Utilized: CHOCO    Interdisciplinary communication with: Georgie Roque PT supervisor, Jo Pichardo PTA, Analilia Hauser RN, Ledy Johnson, PT and Elida Hope, OT about the pt going to ER and subsequent COVID 19+ diagnosis    Discharge planning as follows: pt will be transferred  as he is now inpatient at [facility]    Specific plan for next visit:CHOCO

## 2024-02-09 NOTE — H&P
acuity of patient's medical condition necessitates hospital stay.    Code Status: No intubation but ok with chest compression and shocks  DVT Prophylaxis:   Baseline:   Contact:    Butch Hobbs (Child)  [phone] (Home Phone)       Subjective:   CHIEF COMPLAINT: SOB    HISTORY OF PRESENT ILLNESS:     Jack Hobbs is a 89 y.o.  male with PMHx significant for the above presents with 3 days of worsening dyspnea with orthopnea and chest tightness. Denies chest pain on my eval. Son notes patient had difficulty sleeping with wheezing as well. Son needed to prop patient up in bed overnight. Home health PT saw him this morning and noted he was satting 88 while seated on room air.  No chest pain, no pleurisy.  No abdominal pain, no nausea and vomiting. Recently discharged from [facility] after hospitalization with broken ribs.  We were asked to admit for work up and evaluation of the above problems.     Past Medical History:   Diagnosis Date    CHF (congestive heart failure) (HCC)     Diabetes mellitus (HCC)         No past surgical history on file.    Social History     Tobacco Use    Smoking status: Not on file    Smokeless tobacco: Not on file   Substance Use Topics    Alcohol use: Not on file        No family history on file.    No Known Allergies     Prior to Admission medications    Medication Sig Start Date End Date Taking? Authorizing Provider   metoprolol succinate (TOPROL XL) 25 MG extended release tablet Take 0.5 tablets by mouth daily   Yes Angelito Oliveros MD   lidocaine (LIDODERM) 5 % Place 1 patch onto the skin daily 12 hours on, 12 hours off.   Yes Angelito Oliveros MD   polyethylene glycol (GLYCOLAX) 17 GM/SCOOP powder Take 17 g by mouth daily as needed (Constipation)    ProviderAngelito MD   acetaminophen (TYLENOL) 500 MG tablet Take 1 tablet by mouth every 6 hours as needed for Pain    Angelito Oliveros MD   furosemide (LASIX) 20 MG tablet Take 2 tablets by mouth daily Daily for 30

## 2024-02-09 NOTE — ED PROVIDER NOTES
productive cough of clear mucus.  He denies any abdominal pain, nausea vomiting or diarrhea.  EMS had given patient 1 DuoNeb and route to the hospital and placed Nitropaste on patient's chest.  He denies any leg pain or swelling.       Patient's symptoms most likely consistent with CHF however will obtain sepsis labs.  Will obtain EKG chest x-ray.  Will send off a BNP to evaluate for volume overload.  Will also send off COVID testing.    EKG obtained at 10:07 AM interpreted by me ventricular paced rhythm rate 83, no acute changes    Patient with no elevated white count.  Chest x-ray shows pulmonary edema with elevated BNP.  Patient given 80 mg IV Lasix.  Nitropaste on place.      Given volume overload state in addition to respiratory distress will consult hospitalist for admission.    Consult note:  Case discussed with hospitalist patient be evaluated admitted.    Of note patient also COVID-positive.    FINAL IMPRESSION     1. Acute respiratory distress    2. Acute pulmonary edema (HCC)    3. COVID-19          DISPOSITION/PLAN   Jack Hobbs's  results have been reviewed with him.  He has been counseled regarding his diagnosis, treatment, and plan.  He verbally conveys understanding and agreement of the signs, symptoms, diagnosis, treatment and prognosis and additionally agrees to follow up as discussed.  He also agrees with the care-plan and conveys that all of his questions have been answered.      CLINICAL IMPRESSION    Admit Note: Pt is being admitted by hospitalist. The results of their tests and reason(s) for their admission have been discussed with pt and/or available family. They convey agreement and understanding for the need to be admitted and for the admission diagnosis.           I am the Primary Clinician of Record.   Anam Serrano DO (electronically signed)    (Please note that parts of this dictation were completed with voice recognition software. Quite often unanticipated grammatical, syntax,

## 2024-02-09 NOTE — ED NOTES
Assumed care of pt at this time. Pt arrives with reports of SOB and chest tightness over the past 3 days that has increased in intensity. Pt states it is worse when lying flat. Pt O2 high 80s low 90s per EMS. EMS administered 2 nebs, and 2inches nitro. Pt has pmhx CHF, HTN. Pt on monitor x3 with call bell in reach.     1115: Pt resting in ED stretcher and on monitor cx3 with call bell in reach. Pt family at bedside.     1215: Pt resting in ER stretcher. Pt remains on monitor x3 with call bell in reach.     1330: Pt remains on monitor x3 with call bell in reach. Pt family at bedside.     1430: Pt has no complaints. Pt remains on monitor x3 with call bell in reach. Pt family at bedside.     1445: Pt brief saturated in urine at this time. Pt clean and dry with new sheets, new gown and placed on male wik at this time .    1545: Pt resting in ER stretcher. Pt on monitor x3 with call bell in reach.     1645: Pt has no complaints. Pt remains on monitor x3 with call bell in reach.     1745: Pt resting in ED stretcher with call bell in reach. Pt remains on monitor x3. Pt family at bedside. Provided pt dinner tray at this time.     1845: Pt resting with no complaints. Pt remains on monitor x3 with call bell in reach. Pt family at bedside.

## 2024-02-09 NOTE — HOME HEALTH
Clinical assessment (what this visit means for the patient overall and need for ongoing skilled care) and progress or lack of progress towards SPECIFIC goals: 89 yo male admitted to home health after recent hospitalization and SNF placement for fall resulting in rib fractures. Family reports pt has been in declining health the last 2 years. Reports pt had a short time in Nov he was doing very well prior to most recent fall. Pt uses rollator for mobility. Personable and motivated, Pt has had declinign health the last several months. patient presents at high risk of falls (MAHC-10 = 9), and mod A for safe ADL completion (Modified Barthel Index = 45/100) during OT Eval. Pt scored 3/5 on BUE MMT. Pt is noted with SOB with min ax although O2 levels remain in the mid to lower 90s. He is currently unsafe with ambulating in/out of bathroom due to rollator not fitting in the bathroom. pt does have multiple grab bars however bathroom is awkward with the door opening inside the bathroom. Pt is also needing assistance with all parts of ADLS due to impaired safety adn judgement. Skilled OT services are necessary to reduce patient's fall risk and assist him in safely returning to his PLOF.    Subjective: Patient states he is very tired. Son reports the last 2 days pt has been more tired and confused.     Falls since last visit No(if yes complete the Fall Tracking Form and include bsrifallreport):   Caregiver involvement changes: son and daughter in law are primary caregivers and able to assist with care as needed.     Home health supplies by type and quantity ordered/delivered this visit include: None    Clinician asked if patient has had any physician contact since last home care visit and patient states: NO  Clinician asked if patient has any new or changed medications and patient states:  NO   If Yes, were medications reconciled? N/A   Was the certifying physician notified of changes in medications? N/A     Written Teaching

## 2024-02-09 NOTE — WOUND CARE
Wound care orders to be carried out until seen by wound care team. This wound is not the reason for his admit.    Patient has a POA Stage 4 sacral wound measuring 1.5 x 1 x 1 cm per home health medical records 2/6/24 and 2/8/24 found in chart review. Wound care orders for [facility] include Vashe for cleansing and Plurogel  wound dressing for moist to dry wound care. We do not carry Plurogel at this facility.     Sacral wound: daily, cleanse with NS flush of 5-10 mls, wipe clean. Pack wound with NS moist 1/4-1/2\" plain packing and cover with a foam dressing. Date and time each dressing.    WC team will see patient as soon as able on Monday.    WEST TROY RN, CWON

## 2024-02-10 LAB
ANION GAP SERPL CALC-SCNC: 5 MMOL/L (ref 5–15)
BASOPHILS # BLD: 0 K/UL (ref 0–0.1)
BASOPHILS NFR BLD: 0 % (ref 0–1)
BUN SERPL-MCNC: 22 MG/DL (ref 6–20)
BUN/CREAT SERPL: 26 (ref 12–20)
CALCIUM SERPL-MCNC: 8.2 MG/DL (ref 8.5–10.1)
CHLORIDE SERPL-SCNC: 108 MMOL/L (ref 97–108)
CO2 SERPL-SCNC: 34 MMOL/L (ref 21–32)
CREAT SERPL-MCNC: 0.85 MG/DL (ref 0.7–1.3)
DIFFERENTIAL METHOD BLD: ABNORMAL
EKG ATRIAL RATE: 76 BPM
EKG DIAGNOSIS: NORMAL
EKG Q-T INTERVAL: 438 MS
EKG QRS DURATION: 120 MS
EKG QTC CALCULATION (BAZETT): 514 MS
EKG R AXIS: -20 DEGREES
EKG T AXIS: 161 DEGREES
EKG VENTRICULAR RATE: 83 BPM
EOSINOPHIL # BLD: 0 K/UL (ref 0–0.4)
EOSINOPHIL NFR BLD: 0 % (ref 0–7)
ERYTHROCYTE [DISTWIDTH] IN BLOOD BY AUTOMATED COUNT: 13.7 % (ref 11.5–14.5)
EST. AVERAGE GLUCOSE BLD GHB EST-MCNC: 108 MG/DL
GLUCOSE BLD STRIP.AUTO-MCNC: 116 MG/DL (ref 65–117)
GLUCOSE BLD STRIP.AUTO-MCNC: 89 MG/DL (ref 65–117)
GLUCOSE BLD STRIP.AUTO-MCNC: 96 MG/DL (ref 65–117)
GLUCOSE BLD STRIP.AUTO-MCNC: 98 MG/DL (ref 65–117)
GLUCOSE SERPL-MCNC: 87 MG/DL (ref 65–100)
HBA1C MFR BLD: 5.4 % (ref 4–5.6)
HCT VFR BLD AUTO: 36.3 % (ref 36.6–50.3)
HGB BLD-MCNC: 11.1 G/DL (ref 12.1–17)
IMM GRANULOCYTES # BLD AUTO: 0 K/UL (ref 0–0.04)
IMM GRANULOCYTES NFR BLD AUTO: 0 % (ref 0–0.5)
LYMPHOCYTES # BLD: 1.3 K/UL (ref 0.8–3.5)
LYMPHOCYTES NFR BLD: 28 % (ref 12–49)
MAGNESIUM SERPL-MCNC: 1.8 MG/DL (ref 1.6–2.4)
MCH RBC QN AUTO: 27.9 PG (ref 26–34)
MCHC RBC AUTO-ENTMCNC: 30.6 G/DL (ref 30–36.5)
MCV RBC AUTO: 91.2 FL (ref 80–99)
MONOCYTES # BLD: 0.4 K/UL (ref 0–1)
MONOCYTES NFR BLD: 9 % (ref 5–13)
NEUTS SEG # BLD: 2.8 K/UL (ref 1.8–8)
NEUTS SEG NFR BLD: 63 % (ref 32–75)
NRBC # BLD: 0 K/UL (ref 0–0.01)
NRBC BLD-RTO: 0 PER 100 WBC
PHOSPHATE SERPL-MCNC: 3.1 MG/DL (ref 2.6–4.7)
PLATELET # BLD AUTO: 150 K/UL (ref 150–400)
PMV BLD AUTO: 10.5 FL (ref 8.9–12.9)
POTASSIUM SERPL-SCNC: 3.6 MMOL/L (ref 3.5–5.1)
RBC # BLD AUTO: 3.98 M/UL (ref 4.1–5.7)
SERVICE CMNT-IMP: NORMAL
SODIUM SERPL-SCNC: 147 MMOL/L (ref 136–145)
WBC # BLD AUTO: 4.5 K/UL (ref 4.1–11.1)

## 2024-02-10 PROCEDURE — 36415 COLL VENOUS BLD VENIPUNCTURE: CPT

## 2024-02-10 PROCEDURE — 85025 COMPLETE CBC W/AUTO DIFF WBC: CPT

## 2024-02-10 PROCEDURE — 1100000003 HC PRIVATE W/ TELEMETRY

## 2024-02-10 PROCEDURE — 6370000000 HC RX 637 (ALT 250 FOR IP): Performed by: STUDENT IN AN ORGANIZED HEALTH CARE EDUCATION/TRAINING PROGRAM

## 2024-02-10 PROCEDURE — 6360000002 HC RX W HCPCS: Performed by: STUDENT IN AN ORGANIZED HEALTH CARE EDUCATION/TRAINING PROGRAM

## 2024-02-10 PROCEDURE — 84100 ASSAY OF PHOSPHORUS: CPT

## 2024-02-10 PROCEDURE — 83735 ASSAY OF MAGNESIUM: CPT

## 2024-02-10 PROCEDURE — 82962 GLUCOSE BLOOD TEST: CPT

## 2024-02-10 PROCEDURE — 2580000003 HC RX 258: Performed by: STUDENT IN AN ORGANIZED HEALTH CARE EDUCATION/TRAINING PROGRAM

## 2024-02-10 PROCEDURE — 80048 BASIC METABOLIC PNL TOTAL CA: CPT

## 2024-02-10 RX ADMIN — ESCITALOPRAM OXALATE 5 MG: 10 TABLET ORAL at 09:38

## 2024-02-10 RX ADMIN — METOPROLOL SUCCINATE 12.5 MG: 25 TABLET, EXTENDED RELEASE ORAL at 09:38

## 2024-02-10 RX ADMIN — ONDANSETRON 4 MG: 2 INJECTION INTRAMUSCULAR; INTRAVENOUS at 22:37

## 2024-02-10 RX ADMIN — FUROSEMIDE 40 MG: 10 INJECTION, SOLUTION INTRAMUSCULAR; INTRAVENOUS at 09:39

## 2024-02-10 RX ADMIN — ATORVASTATIN CALCIUM 10 MG: 10 TABLET, FILM COATED ORAL at 09:38

## 2024-02-10 RX ADMIN — FUROSEMIDE 40 MG: 10 INJECTION, SOLUTION INTRAMUSCULAR; INTRAVENOUS at 17:15

## 2024-02-10 RX ADMIN — DOXYCYCLINE HYCLATE 100 MG: 100 TABLET, FILM COATED ORAL at 09:38

## 2024-02-10 RX ADMIN — ACETAMINOPHEN 650 MG: 325 TABLET ORAL at 22:32

## 2024-02-10 RX ADMIN — SODIUM CHLORIDE, PRESERVATIVE FREE 10 ML: 5 INJECTION INTRAVENOUS at 20:50

## 2024-02-10 RX ADMIN — SODIUM CHLORIDE, PRESERVATIVE FREE 10 ML: 5 INJECTION INTRAVENOUS at 09:39

## 2024-02-10 RX ADMIN — APIXABAN 5 MG: 5 TABLET, FILM COATED ORAL at 09:39

## 2024-02-10 RX ADMIN — PANTOPRAZOLE SODIUM 40 MG: 40 TABLET, DELAYED RELEASE ORAL at 09:38

## 2024-02-10 RX ADMIN — APIXABAN 5 MG: 5 TABLET, FILM COATED ORAL at 17:15

## 2024-02-10 RX ADMIN — PANTOPRAZOLE SODIUM 40 MG: 40 TABLET, DELAYED RELEASE ORAL at 17:15

## 2024-02-10 RX ADMIN — DOXYCYCLINE HYCLATE 100 MG: 100 TABLET, FILM COATED ORAL at 20:48

## 2024-02-10 NOTE — ED NOTES
Pt under pad clean and dry at this time. Pt remains on purewik disconnected from suction to go upstairs.

## 2024-02-11 LAB
ANION GAP SERPL CALC-SCNC: 4 MMOL/L (ref 5–15)
BASOPHILS # BLD: 0 K/UL (ref 0–0.1)
BASOPHILS NFR BLD: 0 % (ref 0–1)
BUN SERPL-MCNC: 31 MG/DL (ref 6–20)
BUN/CREAT SERPL: 27 (ref 12–20)
CALCIUM SERPL-MCNC: 8.4 MG/DL (ref 8.5–10.1)
CHLORIDE SERPL-SCNC: 104 MMOL/L (ref 97–108)
CO2 SERPL-SCNC: 34 MMOL/L (ref 21–32)
CREAT SERPL-MCNC: 1.13 MG/DL (ref 0.7–1.3)
DIFFERENTIAL METHOD BLD: ABNORMAL
EOSINOPHIL # BLD: 0 K/UL (ref 0–0.4)
EOSINOPHIL NFR BLD: 0 % (ref 0–7)
ERYTHROCYTE [DISTWIDTH] IN BLOOD BY AUTOMATED COUNT: 13.7 % (ref 11.5–14.5)
GLUCOSE BLD STRIP.AUTO-MCNC: 106 MG/DL (ref 65–117)
GLUCOSE BLD STRIP.AUTO-MCNC: 123 MG/DL (ref 65–117)
GLUCOSE BLD STRIP.AUTO-MCNC: 96 MG/DL (ref 65–117)
GLUCOSE BLD STRIP.AUTO-MCNC: 97 MG/DL (ref 65–117)
GLUCOSE SERPL-MCNC: 104 MG/DL (ref 65–100)
HCT VFR BLD AUTO: 37 % (ref 36.6–50.3)
HGB BLD-MCNC: 11.2 G/DL (ref 12.1–17)
IMM GRANULOCYTES # BLD AUTO: 0 K/UL (ref 0–0.04)
IMM GRANULOCYTES NFR BLD AUTO: 1 % (ref 0–0.5)
LYMPHOCYTES # BLD: 1.7 K/UL (ref 0.8–3.5)
LYMPHOCYTES NFR BLD: 29 % (ref 12–49)
MAGNESIUM SERPL-MCNC: 1.9 MG/DL (ref 1.6–2.4)
MCH RBC QN AUTO: 27.1 PG (ref 26–34)
MCHC RBC AUTO-ENTMCNC: 30.3 G/DL (ref 30–36.5)
MCV RBC AUTO: 89.4 FL (ref 80–99)
MONOCYTES # BLD: 0.3 K/UL (ref 0–1)
MONOCYTES NFR BLD: 6 % (ref 5–13)
NEUTS SEG # BLD: 3.6 K/UL (ref 1.8–8)
NEUTS SEG NFR BLD: 64 % (ref 32–75)
NRBC # BLD: 0 K/UL (ref 0–0.01)
NRBC BLD-RTO: 0 PER 100 WBC
PHOSPHATE SERPL-MCNC: 3.7 MG/DL (ref 2.6–4.7)
PLATELET # BLD AUTO: 177 K/UL (ref 150–400)
PMV BLD AUTO: 11.1 FL (ref 8.9–12.9)
POTASSIUM SERPL-SCNC: 3.8 MMOL/L (ref 3.5–5.1)
RBC # BLD AUTO: 4.14 M/UL (ref 4.1–5.7)
SERVICE CMNT-IMP: ABNORMAL
SERVICE CMNT-IMP: NORMAL
SODIUM SERPL-SCNC: 142 MMOL/L (ref 136–145)
WBC # BLD AUTO: 5.7 K/UL (ref 4.1–11.1)

## 2024-02-11 PROCEDURE — 1100000003 HC PRIVATE W/ TELEMETRY

## 2024-02-11 PROCEDURE — 83735 ASSAY OF MAGNESIUM: CPT

## 2024-02-11 PROCEDURE — 36415 COLL VENOUS BLD VENIPUNCTURE: CPT

## 2024-02-11 PROCEDURE — 97162 PT EVAL MOD COMPLEX 30 MIN: CPT

## 2024-02-11 PROCEDURE — 82962 GLUCOSE BLOOD TEST: CPT

## 2024-02-11 PROCEDURE — 97535 SELF CARE MNGMENT TRAINING: CPT

## 2024-02-11 PROCEDURE — 84100 ASSAY OF PHOSPHORUS: CPT

## 2024-02-11 PROCEDURE — 97166 OT EVAL MOD COMPLEX 45 MIN: CPT

## 2024-02-11 PROCEDURE — 6370000000 HC RX 637 (ALT 250 FOR IP): Performed by: STUDENT IN AN ORGANIZED HEALTH CARE EDUCATION/TRAINING PROGRAM

## 2024-02-11 PROCEDURE — 85025 COMPLETE CBC W/AUTO DIFF WBC: CPT

## 2024-02-11 PROCEDURE — 97530 THERAPEUTIC ACTIVITIES: CPT

## 2024-02-11 PROCEDURE — 80048 BASIC METABOLIC PNL TOTAL CA: CPT

## 2024-02-11 PROCEDURE — 2580000003 HC RX 258: Performed by: STUDENT IN AN ORGANIZED HEALTH CARE EDUCATION/TRAINING PROGRAM

## 2024-02-11 RX ADMIN — APIXABAN 5 MG: 5 TABLET, FILM COATED ORAL at 09:06

## 2024-02-11 RX ADMIN — APIXABAN 5 MG: 5 TABLET, FILM COATED ORAL at 15:32

## 2024-02-11 RX ADMIN — PANTOPRAZOLE SODIUM 40 MG: 40 TABLET, DELAYED RELEASE ORAL at 09:06

## 2024-02-11 RX ADMIN — ATORVASTATIN CALCIUM 10 MG: 10 TABLET, FILM COATED ORAL at 09:06

## 2024-02-11 RX ADMIN — ESCITALOPRAM OXALATE 5 MG: 10 TABLET ORAL at 09:06

## 2024-02-11 RX ADMIN — FERROUS SULFATE TAB 325 MG (65 MG ELEMENTAL FE) 325 MG: 325 (65 FE) TAB at 09:27

## 2024-02-11 RX ADMIN — SODIUM CHLORIDE, PRESERVATIVE FREE 10 ML: 5 INJECTION INTRAVENOUS at 20:24

## 2024-02-11 RX ADMIN — PANTOPRAZOLE SODIUM 40 MG: 40 TABLET, DELAYED RELEASE ORAL at 15:32

## 2024-02-11 RX ADMIN — DOXYCYCLINE HYCLATE 100 MG: 100 TABLET, FILM COATED ORAL at 20:24

## 2024-02-11 RX ADMIN — DOXYCYCLINE HYCLATE 100 MG: 100 TABLET, FILM COATED ORAL at 09:06

## 2024-02-11 RX ADMIN — SODIUM CHLORIDE, PRESERVATIVE FREE 5 ML: 5 INJECTION INTRAVENOUS at 09:07

## 2024-02-12 LAB
ANION GAP SERPL CALC-SCNC: 4 MMOL/L (ref 5–15)
BASOPHILS # BLD: 0 K/UL (ref 0–0.1)
BASOPHILS NFR BLD: 1 % (ref 0–1)
BUN SERPL-MCNC: 32 MG/DL (ref 6–20)
BUN/CREAT SERPL: 30 (ref 12–20)
CALCIUM SERPL-MCNC: 8.6 MG/DL (ref 8.5–10.1)
CHLORIDE SERPL-SCNC: 102 MMOL/L (ref 97–108)
CO2 SERPL-SCNC: 35 MMOL/L (ref 21–32)
CREAT SERPL-MCNC: 1.06 MG/DL (ref 0.7–1.3)
DIFFERENTIAL METHOD BLD: ABNORMAL
EOSINOPHIL # BLD: 0 K/UL (ref 0–0.4)
EOSINOPHIL NFR BLD: 1 % (ref 0–7)
ERYTHROCYTE [DISTWIDTH] IN BLOOD BY AUTOMATED COUNT: 13.8 % (ref 11.5–14.5)
GLUCOSE BLD STRIP.AUTO-MCNC: 120 MG/DL (ref 65–117)
GLUCOSE BLD STRIP.AUTO-MCNC: 144 MG/DL (ref 65–117)
GLUCOSE BLD STRIP.AUTO-MCNC: 86 MG/DL (ref 65–117)
GLUCOSE BLD STRIP.AUTO-MCNC: 96 MG/DL (ref 65–117)
GLUCOSE SERPL-MCNC: 97 MG/DL (ref 65–100)
HCT VFR BLD AUTO: 37.9 % (ref 36.6–50.3)
HGB BLD-MCNC: 11.6 G/DL (ref 12.1–17)
IMM GRANULOCYTES # BLD AUTO: 0 K/UL (ref 0–0.04)
IMM GRANULOCYTES NFR BLD AUTO: 0 % (ref 0–0.5)
LYMPHOCYTES # BLD: 1.7 K/UL (ref 0.8–3.5)
LYMPHOCYTES NFR BLD: 32 % (ref 12–49)
MAGNESIUM SERPL-MCNC: 1.9 MG/DL (ref 1.6–2.4)
MCH RBC QN AUTO: 27.8 PG (ref 26–34)
MCHC RBC AUTO-ENTMCNC: 30.6 G/DL (ref 30–36.5)
MCV RBC AUTO: 90.9 FL (ref 80–99)
MONOCYTES NFR BLD: 8 % (ref 5–13)
NEUTS SEG # BLD: 3.2 K/UL (ref 1.8–8)
NEUTS SEG NFR BLD: 58 % (ref 32–75)
NRBC # BLD: 0 K/UL (ref 0–0.01)
NRBC BLD-RTO: 0 PER 100 WBC
PHOSPHATE SERPL-MCNC: 3.6 MG/DL (ref 2.6–4.7)
PLATELET # BLD AUTO: 153 K/UL (ref 150–400)
PMV BLD AUTO: 11.3 FL (ref 8.9–12.9)
POTASSIUM SERPL-SCNC: 3.5 MMOL/L (ref 3.5–5.1)
RBC # BLD AUTO: 4.17 M/UL (ref 4.1–5.7)
SERVICE CMNT-IMP: ABNORMAL
SERVICE CMNT-IMP: ABNORMAL
SERVICE CMNT-IMP: NORMAL
SERVICE CMNT-IMP: NORMAL
SODIUM SERPL-SCNC: 141 MMOL/L (ref 136–145)
WBC # BLD AUTO: 5.3 K/UL (ref 4.1–11.1)

## 2024-02-12 PROCEDURE — 36415 COLL VENOUS BLD VENIPUNCTURE: CPT

## 2024-02-12 PROCEDURE — 6360000002 HC RX W HCPCS: Performed by: STUDENT IN AN ORGANIZED HEALTH CARE EDUCATION/TRAINING PROGRAM

## 2024-02-12 PROCEDURE — 80048 BASIC METABOLIC PNL TOTAL CA: CPT

## 2024-02-12 PROCEDURE — 1100000003 HC PRIVATE W/ TELEMETRY

## 2024-02-12 PROCEDURE — 85025 COMPLETE CBC W/AUTO DIFF WBC: CPT

## 2024-02-12 PROCEDURE — 82962 GLUCOSE BLOOD TEST: CPT

## 2024-02-12 PROCEDURE — 83735 ASSAY OF MAGNESIUM: CPT

## 2024-02-12 PROCEDURE — 6370000000 HC RX 637 (ALT 250 FOR IP): Performed by: STUDENT IN AN ORGANIZED HEALTH CARE EDUCATION/TRAINING PROGRAM

## 2024-02-12 PROCEDURE — 2580000003 HC RX 258: Performed by: STUDENT IN AN ORGANIZED HEALTH CARE EDUCATION/TRAINING PROGRAM

## 2024-02-12 PROCEDURE — 84100 ASSAY OF PHOSPHORUS: CPT

## 2024-02-12 RX ADMIN — APIXABAN 5 MG: 5 TABLET, FILM COATED ORAL at 10:06

## 2024-02-12 RX ADMIN — ESCITALOPRAM OXALATE 5 MG: 10 TABLET ORAL at 10:06

## 2024-02-12 RX ADMIN — ATORVASTATIN CALCIUM 10 MG: 10 TABLET, FILM COATED ORAL at 10:08

## 2024-02-12 RX ADMIN — SODIUM CHLORIDE, PRESERVATIVE FREE 10 ML: 5 INJECTION INTRAVENOUS at 20:53

## 2024-02-12 RX ADMIN — FUROSEMIDE 40 MG: 10 INJECTION, SOLUTION INTRAMUSCULAR; INTRAVENOUS at 16:28

## 2024-02-12 RX ADMIN — PANTOPRAZOLE SODIUM 40 MG: 40 TABLET, DELAYED RELEASE ORAL at 10:06

## 2024-02-12 RX ADMIN — FUROSEMIDE 40 MG: 10 INJECTION, SOLUTION INTRAMUSCULAR; INTRAVENOUS at 10:06

## 2024-02-12 RX ADMIN — DOXYCYCLINE HYCLATE 100 MG: 100 TABLET, FILM COATED ORAL at 20:53

## 2024-02-12 RX ADMIN — APIXABAN 5 MG: 5 TABLET, FILM COATED ORAL at 16:28

## 2024-02-12 RX ADMIN — SODIUM CHLORIDE, PRESERVATIVE FREE 10 ML: 5 INJECTION INTRAVENOUS at 10:09

## 2024-02-12 RX ADMIN — DOXYCYCLINE HYCLATE 100 MG: 100 TABLET, FILM COATED ORAL at 10:07

## 2024-02-12 RX ADMIN — METOPROLOL SUCCINATE 12.5 MG: 25 TABLET, EXTENDED RELEASE ORAL at 10:06

## 2024-02-12 RX ADMIN — PANTOPRAZOLE SODIUM 40 MG: 40 TABLET, DELAYED RELEASE ORAL at 16:28

## 2024-02-12 NOTE — CARE COORDINATION
Representative who was provided with the Choice of Provider and agrees with the Discharge Plan?  cadence Hobbs   Freedom of Choice list was provided with basic dialogue that supports the patient's individualized plan of care/goals, treatment preferences, and shares the quality data associated with the providers?  Yes       CARMEN Salazar, CM  [id number]

## 2024-02-12 NOTE — WOUND CARE
Wound care nurse consult for POA sacral chronic stage 4 pressure injury.    89 y/o male admitted for Covid 19 and CHF.   Past Medical History:   Diagnosis Date    CHF (congestive heart failure) (HCC)     Diabetes mellitus (HCC)      History reviewed. No pertinent surgical history.  Patient receiving C for sacral wound prior to admission.  nurse able to see C notes.Patient is having packing to wound using Plurogel which we do not have at this facility  1.5 x 1 x 1cm      Wound care orders written Friday for NS moist to dry packing daily is the correct wound care for inpatient and he may resume his home health care orders upon discharge.    WEST TROY RN, CWON

## 2024-02-13 LAB
ANION GAP SERPL CALC-SCNC: 5 MMOL/L (ref 5–15)
BASOPHILS # BLD: 0 K/UL (ref 0–0.1)
BASOPHILS NFR BLD: 0 % (ref 0–1)
BUN SERPL-MCNC: 35 MG/DL (ref 6–20)
BUN/CREAT SERPL: 27 (ref 12–20)
CALCIUM SERPL-MCNC: 8.3 MG/DL (ref 8.5–10.1)
CHLORIDE SERPL-SCNC: 102 MMOL/L (ref 97–108)
CO2 SERPL-SCNC: 34 MMOL/L (ref 21–32)
CREAT SERPL-MCNC: 1.29 MG/DL (ref 0.7–1.3)
DIFFERENTIAL METHOD BLD: ABNORMAL
EOSINOPHIL # BLD: 0 K/UL (ref 0–0.4)
EOSINOPHIL NFR BLD: 0 % (ref 0–7)
ERYTHROCYTE [DISTWIDTH] IN BLOOD BY AUTOMATED COUNT: 13.6 % (ref 11.5–14.5)
GLUCOSE BLD STRIP.AUTO-MCNC: 114 MG/DL (ref 65–117)
GLUCOSE BLD STRIP.AUTO-MCNC: 120 MG/DL (ref 65–117)
GLUCOSE BLD STRIP.AUTO-MCNC: 82 MG/DL (ref 65–117)
GLUCOSE BLD STRIP.AUTO-MCNC: 92 MG/DL (ref 65–117)
GLUCOSE SERPL-MCNC: 97 MG/DL (ref 65–100)
HCT VFR BLD AUTO: 38.5 % (ref 36.6–50.3)
HGB BLD-MCNC: 11.5 G/DL (ref 12.1–17)
IMM GRANULOCYTES # BLD AUTO: 0 K/UL (ref 0–0.04)
IMM GRANULOCYTES NFR BLD AUTO: 1 % (ref 0–0.5)
LYMPHOCYTES # BLD: 1.5 K/UL (ref 0.8–3.5)
LYMPHOCYTES NFR BLD: 26 % (ref 12–49)
MAGNESIUM SERPL-MCNC: 1.8 MG/DL (ref 1.6–2.4)
MCH RBC QN AUTO: 27.1 PG (ref 26–34)
MCHC RBC AUTO-ENTMCNC: 29.9 G/DL (ref 30–36.5)
MCV RBC AUTO: 90.6 FL (ref 80–99)
MONOCYTES # BLD: 0.4 K/UL (ref 0–1)
MONOCYTES NFR BLD: 7 % (ref 5–13)
NEUTS SEG # BLD: 3.7 K/UL (ref 1.8–8)
NEUTS SEG NFR BLD: 66 % (ref 32–75)
NRBC # BLD: 0 K/UL (ref 0–0.01)
NRBC BLD-RTO: 0 PER 100 WBC
PHOSPHATE SERPL-MCNC: 3.5 MG/DL (ref 2.6–4.7)
PLATELET # BLD AUTO: 162 K/UL (ref 150–400)
PMV BLD AUTO: 11.5 FL (ref 8.9–12.9)
POTASSIUM SERPL-SCNC: 3.7 MMOL/L (ref 3.5–5.1)
RBC # BLD AUTO: 4.25 M/UL (ref 4.1–5.7)
SERVICE CMNT-IMP: ABNORMAL
SERVICE CMNT-IMP: NORMAL
SODIUM SERPL-SCNC: 141 MMOL/L (ref 136–145)
WBC # BLD AUTO: 5.7 K/UL (ref 4.1–11.1)

## 2024-02-13 PROCEDURE — 84100 ASSAY OF PHOSPHORUS: CPT

## 2024-02-13 PROCEDURE — 6370000000 HC RX 637 (ALT 250 FOR IP): Performed by: STUDENT IN AN ORGANIZED HEALTH CARE EDUCATION/TRAINING PROGRAM

## 2024-02-13 PROCEDURE — 6360000002 HC RX W HCPCS: Performed by: STUDENT IN AN ORGANIZED HEALTH CARE EDUCATION/TRAINING PROGRAM

## 2024-02-13 PROCEDURE — 83735 ASSAY OF MAGNESIUM: CPT

## 2024-02-13 PROCEDURE — 85025 COMPLETE CBC W/AUTO DIFF WBC: CPT

## 2024-02-13 PROCEDURE — 2580000003 HC RX 258: Performed by: STUDENT IN AN ORGANIZED HEALTH CARE EDUCATION/TRAINING PROGRAM

## 2024-02-13 PROCEDURE — 80048 BASIC METABOLIC PNL TOTAL CA: CPT

## 2024-02-13 PROCEDURE — 1100000003 HC PRIVATE W/ TELEMETRY

## 2024-02-13 PROCEDURE — 97530 THERAPEUTIC ACTIVITIES: CPT | Performed by: PHYSICAL THERAPIST

## 2024-02-13 PROCEDURE — 82962 GLUCOSE BLOOD TEST: CPT

## 2024-02-13 PROCEDURE — 36415 COLL VENOUS BLD VENIPUNCTURE: CPT

## 2024-02-13 RX ADMIN — APIXABAN 5 MG: 5 TABLET, FILM COATED ORAL at 10:02

## 2024-02-13 RX ADMIN — SODIUM CHLORIDE, PRESERVATIVE FREE 10 ML: 5 INJECTION INTRAVENOUS at 20:37

## 2024-02-13 RX ADMIN — PANTOPRAZOLE SODIUM 40 MG: 40 TABLET, DELAYED RELEASE ORAL at 10:02

## 2024-02-13 RX ADMIN — APIXABAN 5 MG: 5 TABLET, FILM COATED ORAL at 14:06

## 2024-02-13 RX ADMIN — ESCITALOPRAM OXALATE 5 MG: 10 TABLET ORAL at 10:03

## 2024-02-13 RX ADMIN — METOPROLOL SUCCINATE 12.5 MG: 25 TABLET, EXTENDED RELEASE ORAL at 10:02

## 2024-02-13 RX ADMIN — FERROUS SULFATE TAB 325 MG (65 MG ELEMENTAL FE) 325 MG: 325 (65 FE) TAB at 14:06

## 2024-02-13 RX ADMIN — SODIUM CHLORIDE, PRESERVATIVE FREE 10 ML: 5 INJECTION INTRAVENOUS at 10:03

## 2024-02-13 RX ADMIN — DOXYCYCLINE HYCLATE 100 MG: 100 TABLET, FILM COATED ORAL at 10:02

## 2024-02-13 RX ADMIN — ATORVASTATIN CALCIUM 10 MG: 10 TABLET, FILM COATED ORAL at 10:03

## 2024-02-13 RX ADMIN — PANTOPRAZOLE SODIUM 40 MG: 40 TABLET, DELAYED RELEASE ORAL at 14:06

## 2024-02-13 RX ADMIN — FUROSEMIDE 40 MG: 10 INJECTION, SOLUTION INTRAMUSCULAR; INTRAVENOUS at 17:36

## 2024-02-13 RX ADMIN — FUROSEMIDE 40 MG: 10 INJECTION, SOLUTION INTRAMUSCULAR; INTRAVENOUS at 10:02

## 2024-02-13 RX ADMIN — DOXYCYCLINE HYCLATE 100 MG: 100 TABLET, FILM COATED ORAL at 20:36

## 2024-02-13 NOTE — CONSULTS
[facility]  [address] [place] [address]  [phone]  [email or web address]       CARDIOLOGY CONSULTATION       Date of  Admission: 2/9/2024  9:58 AM     Admission type:Emergency   Primary Care Physician:Benjamin David MD     Attending Provider: Chidi Mayberry MD  Cardiology Provider:     PLEASE NOTE THAT WE CONFIRMED WITH THE REFERRING PHYSICIAN PRIOR TO SEEING THE PATIENT THAT THE PATIENT IS BEING REFERRED FOR INITIAL HOSPITAL EVALUATION AND FOR LONG-TERM ONGOING CARDIAC CARE    CC/REASON FOR CONSULT: Patient admitted with heart failure in the setting of COVID infection     Subjective:     Jack Hobbs is a 89 y.o. male admitted for Acute respiratory distress [R06.03]  Acute pulmonary edema (HCC) [J81.0]  CHF (congestive heart failure), NYHA class I, acute on chronic, combined (HCC) [I50.43]  COVID-19 [U07.1].Patient complains of  dyspnea and tachypnea. Previous treatment/evaluation includes echocardiogram . Cardiac risk factors: advanced age (older than 55 for men, 65 for women), diabetes mellitus, dyslipidemia, and hypertension.  89-year-old male with past medical history of hypertension, diabetes, cardiomyopathy with EF of 20 to 25%, paroxysmal A-fib, s/p bioprosthetic valve, hypertension, chronic pacemaker infection on chronic doxycycline who was admitted with COVID and acute on chronic systolic heart failure.  Patient is being diuresed with IV diuretics currently on room air saturating well with no symptoms.  He has moved recently 2 months ago from [place].  He moved with his son recently    Patient Active Problem List    Diagnosis Date Noted    CHF (congestive heart failure), NYHA class I, acute on chronic, combined (HCC) 02/09/2024      Benjamin David MD  Past Medical History:   Diagnosis Date    CHF (congestive heart failure) (HCC)     Diabetes mellitus (HCC)       Social History     Socioeconomic History    Marital status:      Spouse

## 2024-02-14 ENCOUNTER — APPOINTMENT (OUTPATIENT)
Facility: HOSPITAL | Age: 89
DRG: 291 | End: 2024-02-14
Attending: INTERNAL MEDICINE
Payer: MEDICARE

## 2024-02-14 ENCOUNTER — APPOINTMENT (OUTPATIENT)
Facility: HOSPITAL | Age: 89
DRG: 291 | End: 2024-02-14
Payer: MEDICARE

## 2024-02-14 LAB
ANION GAP SERPL CALC-SCNC: 5 MMOL/L (ref 5–15)
BASOPHILS # BLD: 0 K/UL (ref 0–0.1)
BASOPHILS NFR BLD: 0 % (ref 0–1)
BUN SERPL-MCNC: 40 MG/DL (ref 6–20)
BUN/CREAT SERPL: 34 (ref 12–20)
CALCIUM SERPL-MCNC: 8.6 MG/DL (ref 8.5–10.1)
CHLORIDE SERPL-SCNC: 100 MMOL/L (ref 97–108)
CO2 SERPL-SCNC: 36 MMOL/L (ref 21–32)
CREAT SERPL-MCNC: 1.19 MG/DL (ref 0.7–1.3)
DIFFERENTIAL METHOD BLD: ABNORMAL
ECHO AV MEAN GRADIENT: 3 MMHG
ECHO AV MEAN VELOCITY: 0.9 M/S
ECHO AV PEAK GRADIENT: 7 MMHG
ECHO AV PEAK GRADIENT: 8 MMHG
ECHO AV PEAK VELOCITY: 1.4 M/S
ECHO AV PEAK VELOCITY: 1.4 M/S
ECHO AV VTI: 25.2 CM
ECHO BSA: 1.71 M2
ECHO EST RA PRESSURE: 15 MMHG
ECHO LA DIAMETER INDEX: 2.98 CM/M2
ECHO LA DIAMETER: 5.1 CM
ECHO LV FRACTIONAL SHORTENING: 12 % (ref 28–44)
ECHO LV INTERNAL DIMENSION DIASTOLE INDEX: 2.87 CM/M2
ECHO LV INTERNAL DIMENSION DIASTOLIC: 4.9 CM (ref 4.2–5.9)
ECHO LV INTERNAL DIMENSION SYSTOLIC INDEX: 2.51 CM/M2
ECHO LV INTERNAL DIMENSION SYSTOLIC: 4.3 CM
ECHO LV IVSD: 1.7 CM (ref 0.6–1)
ECHO LV MASS 2D: 378.4 G (ref 88–224)
ECHO LV MASS INDEX 2D: 221.3 G/M2 (ref 49–115)
ECHO LV POSTERIOR WALL DIASTOLIC: 1.7 CM (ref 0.6–1)
ECHO LV RELATIVE WALL THICKNESS RATIO: 0.69
ECHO MV EROA PISA: 0.2 CM2
ECHO MV MAX VELOCITY: 1.2 M/S
ECHO MV MEAN GRADIENT: 2 MMHG
ECHO MV MEAN VELOCITY: 0.7 M/S
ECHO MV PEAK GRADIENT: 5 MMHG
ECHO MV REGURGITANT ALIASING (NYQUIST) VELOCITY: 27 CM/S
ECHO MV REGURGITANT PEAK GRADIENT: 67 MMHG
ECHO MV REGURGITANT PEAK VELOCITY: 4.1 M/S
ECHO MV REGURGITANT RADIUS PISA: 0.62 CM
ECHO MV REGURGITANT VELOCITY PISA: 4.3 M/S
ECHO MV REGURGITANT VTIA: 126.4 CM
ECHO MV REGURGITANT VTIA: 147.2 CM
ECHO MV VTI: 23.2 CM
ECHO RIGHT VENTRICULAR SYSTOLIC PRESSURE (RVSP): 46 MMHG
ECHO RV INTERNAL DIMENSION: 3.7 CM
ECHO TV REGURGITANT MAX VELOCITY: 2.78 M/S
ECHO TV REGURGITANT PEAK GRADIENT: 31 MMHG
ECHO TV REGURGITANT VTI: 92.4 CM
EOSINOPHIL # BLD: 0 K/UL (ref 0–0.4)
EOSINOPHIL NFR BLD: 0 % (ref 0–7)
ERYTHROCYTE [DISTWIDTH] IN BLOOD BY AUTOMATED COUNT: 13.6 % (ref 11.5–14.5)
GLUCOSE BLD STRIP.AUTO-MCNC: 103 MG/DL (ref 65–117)
GLUCOSE BLD STRIP.AUTO-MCNC: 83 MG/DL (ref 65–117)
GLUCOSE BLD STRIP.AUTO-MCNC: 97 MG/DL (ref 65–117)
GLUCOSE BLD STRIP.AUTO-MCNC: 99 MG/DL (ref 65–117)
GLUCOSE SERPL-MCNC: 91 MG/DL (ref 65–100)
HCT VFR BLD AUTO: 40.7 % (ref 36.6–50.3)
HGB BLD-MCNC: 12.4 G/DL (ref 12.1–17)
IMM GRANULOCYTES # BLD AUTO: 0 K/UL (ref 0–0.04)
IMM GRANULOCYTES NFR BLD AUTO: 1 % (ref 0–0.5)
LYMPHOCYTES # BLD: 1.6 K/UL (ref 0.8–3.5)
LYMPHOCYTES NFR BLD: 25 % (ref 12–49)
MAGNESIUM SERPL-MCNC: 1.9 MG/DL (ref 1.6–2.4)
MCH RBC QN AUTO: 27.5 PG (ref 26–34)
MCHC RBC AUTO-ENTMCNC: 30.5 G/DL (ref 30–36.5)
MCV RBC AUTO: 90.2 FL (ref 80–99)
MONOCYTES # BLD: 0.4 K/UL (ref 0–1)
MONOCYTES NFR BLD: 7 % (ref 5–13)
NEUTS SEG # BLD: 4.5 K/UL (ref 1.8–8)
NEUTS SEG NFR BLD: 67 % (ref 32–75)
NRBC # BLD: 0 K/UL (ref 0–0.01)
NRBC BLD-RTO: 0 PER 100 WBC
PHOSPHATE SERPL-MCNC: 3 MG/DL (ref 2.6–4.7)
PLATELET # BLD AUTO: 159 K/UL (ref 150–400)
PMV BLD AUTO: 11.1 FL (ref 8.9–12.9)
POTASSIUM SERPL-SCNC: 3.3 MMOL/L (ref 3.5–5.1)
RBC # BLD AUTO: 4.51 M/UL (ref 4.1–5.7)
SERVICE CMNT-IMP: NORMAL
SODIUM SERPL-SCNC: 141 MMOL/L (ref 136–145)
WBC # BLD AUTO: 6.6 K/UL (ref 4.1–11.1)

## 2024-02-14 PROCEDURE — 71045 X-RAY EXAM CHEST 1 VIEW: CPT

## 2024-02-14 PROCEDURE — 1100000003 HC PRIVATE W/ TELEMETRY

## 2024-02-14 PROCEDURE — 2580000003 HC RX 258: Performed by: STUDENT IN AN ORGANIZED HEALTH CARE EDUCATION/TRAINING PROGRAM

## 2024-02-14 PROCEDURE — 93321 DOPPLER ECHO F-UP/LMTD STD: CPT

## 2024-02-14 PROCEDURE — 6370000000 HC RX 637 (ALT 250 FOR IP): Performed by: NURSE PRACTITIONER

## 2024-02-14 PROCEDURE — 6360000002 HC RX W HCPCS: Performed by: STUDENT IN AN ORGANIZED HEALTH CARE EDUCATION/TRAINING PROGRAM

## 2024-02-14 PROCEDURE — 80048 BASIC METABOLIC PNL TOTAL CA: CPT

## 2024-02-14 PROCEDURE — 6370000000 HC RX 637 (ALT 250 FOR IP): Performed by: STUDENT IN AN ORGANIZED HEALTH CARE EDUCATION/TRAINING PROGRAM

## 2024-02-14 PROCEDURE — 82962 GLUCOSE BLOOD TEST: CPT

## 2024-02-14 PROCEDURE — 83735 ASSAY OF MAGNESIUM: CPT

## 2024-02-14 PROCEDURE — 85025 COMPLETE CBC W/AUTO DIFF WBC: CPT

## 2024-02-14 PROCEDURE — 36415 COLL VENOUS BLD VENIPUNCTURE: CPT

## 2024-02-14 PROCEDURE — 84100 ASSAY OF PHOSPHORUS: CPT

## 2024-02-14 RX ORDER — POTASSIUM CHLORIDE 1.5 G/1.58G
20 POWDER, FOR SOLUTION ORAL ONCE
Status: COMPLETED | OUTPATIENT
Start: 2024-02-14 | End: 2024-02-14

## 2024-02-14 RX ORDER — NIRMATRELVIR AND RITONAVIR 150-100 MG
KIT ORAL
Qty: 20 TABLET | Refills: 0 | Status: SHIPPED | OUTPATIENT
Start: 2024-02-14 | End: 2024-02-19

## 2024-02-14 RX ADMIN — METOPROLOL SUCCINATE 12.5 MG: 25 TABLET, EXTENDED RELEASE ORAL at 08:47

## 2024-02-14 RX ADMIN — APIXABAN 5 MG: 5 TABLET, FILM COATED ORAL at 08:47

## 2024-02-14 RX ADMIN — DOXYCYCLINE HYCLATE 100 MG: 100 TABLET, FILM COATED ORAL at 08:47

## 2024-02-14 RX ADMIN — FUROSEMIDE 40 MG: 10 INJECTION, SOLUTION INTRAMUSCULAR; INTRAVENOUS at 18:33

## 2024-02-14 RX ADMIN — FUROSEMIDE 40 MG: 10 INJECTION, SOLUTION INTRAMUSCULAR; INTRAVENOUS at 08:48

## 2024-02-14 RX ADMIN — APIXABAN 5 MG: 5 TABLET, FILM COATED ORAL at 15:45

## 2024-02-14 RX ADMIN — ESCITALOPRAM OXALATE 5 MG: 10 TABLET ORAL at 08:47

## 2024-02-14 RX ADMIN — POTASSIUM CHLORIDE 20 MEQ: 1.5 FOR SOLUTION ORAL at 07:17

## 2024-02-14 RX ADMIN — ATORVASTATIN CALCIUM 10 MG: 10 TABLET, FILM COATED ORAL at 08:47

## 2024-02-14 RX ADMIN — SODIUM CHLORIDE, PRESERVATIVE FREE 10 ML: 5 INJECTION INTRAVENOUS at 08:47

## 2024-02-14 RX ADMIN — PANTOPRAZOLE SODIUM 40 MG: 40 TABLET, DELAYED RELEASE ORAL at 08:47

## 2024-02-14 RX ADMIN — SODIUM CHLORIDE, PRESERVATIVE FREE 10 ML: 5 INJECTION INTRAVENOUS at 21:59

## 2024-02-14 RX ADMIN — PANTOPRAZOLE SODIUM 40 MG: 40 TABLET, DELAYED RELEASE ORAL at 15:45

## 2024-02-14 RX ADMIN — DOXYCYCLINE HYCLATE 100 MG: 100 TABLET, FILM COATED ORAL at 21:59

## 2024-02-14 NOTE — DISCHARGE INSTRUCTIONS
packing and cover with a foam dressing. Date and time each dressing.    Sacral wound: daily, cleanse with NS flush of 5-10 mls, wipe clean. Pack wound with NS moist 1/4-1/2\" plain packing and cover with a foam dressing. Date and time each dressing.    5. Lab work: cbc and bmp in 1 week     6.Bring these papers with you to your follow up appointments. The papers will help your doctors be sure to continue the care plan from the hospital.      If you have questions regarding the hospital related prescriptions or hospital related issues please call [facility] at . You can always direct your questions to your primary care doctor if you are unable to reach your hospital physician; your PCP works as an extension of your hospital doctor just like your hospital doctor is an extension of your PCP for your time at the hospital ([facility])      Follow-up with:   PCP: @PCP@  Benjamin David MD  [address] [address]  [phone]    Schedule an appointment as soon as possible for a visit in 1 week(s)      Benjamin David MD  [address] [address]  [phone]          Rabia Phan MD  [address] [address]  [phone]    Schedule an appointment as soon as possible for a visit in 1 week(s)      Kavita Nuno MD  [address] [id number]  [phone]    Schedule an appointment as soon as possible for a visit in 1 week(s)         Please call for your own appointment        Information obtained by :  I understand that if any problems occur once I am at home I am to contact my physician.    I understand and acknowledge receipt of the instructions indicated above.                                                                                                                                           Physician's or R.N.'s Signature

## 2024-02-14 NOTE — CONSULTS
Pulmonary, Critical Care, and Sleep Medicine~Consult Note    Name: Jack Hobbs MRN: [id number]   : 1933 Hospital: [facility]   Date: 2024 3:16 PM Admission: 2024     Impression Plan   Acute hypoxemic respiratory failure--resolved  Acute CHF  Covid-19  Rib fx  PAF  HTN  DM Supp O2 prn. On RA. Ex ox prior to discharge  Symptomatic treatment of covid-19  F/u echo  No intervention required for small pleural effusion. He is asymptomatic and not hypoxemic.  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  Being treated this admission for acute CHF. Found to be covid-19 positive.  Hypoxic this admission to 86% on RA. Currently sats 93% on RA.  Hypotensive.  Afebrile    Labs: wbc 6, bicarb 36    Cxr 24: small right pleural effusion on my review of images    CTA chest 1/10/24: No PE. Left-sided sixth through ninth rib fractures. Background of emphysema. Small amount of loculated fluid in the right major fissure. --there is also a small R pleural effusion    ROS: he is feeling well. He denies SOB, cough, chest pain. He's on RA.    Social hx: never smoker    I have reviewed the labs and previous day’s notes.    Pertinent items are noted in HPI.  Past Medical History:   Diagnosis Date    CHF (congestive heart failure) (HCC)     Diabetes mellitus (HCC)       History reviewed. No pertinent surgical history.   Prior to Admission medications    Medication Sig Start Date End Date Taking? Authorizing Provider   nirmatrelvir/ritonavir (PAXLOVID, 150/100,) 10 x 150 MG & 10 x 100MG TBPK

## 2024-02-15 VITALS
HEART RATE: 75 BPM | RESPIRATION RATE: 16 BRPM | TEMPERATURE: 97.5 F | HEIGHT: 66 IN | BODY MASS INDEX: 22.34 KG/M2 | OXYGEN SATURATION: 94 % | WEIGHT: 139 LBS | SYSTOLIC BLOOD PRESSURE: 112 MMHG | DIASTOLIC BLOOD PRESSURE: 60 MMHG

## 2024-02-15 LAB
ANION GAP SERPL CALC-SCNC: 6 MMOL/L (ref 5–15)
BACTERIA SPEC CULT: NORMAL
BACTERIA SPEC CULT: NORMAL
BASOPHILS # BLD: 0 K/UL (ref 0–0.1)
BASOPHILS NFR BLD: 0 % (ref 0–1)
BUN SERPL-MCNC: 39 MG/DL (ref 6–20)
BUN/CREAT SERPL: 29 (ref 12–20)
CALCIUM SERPL-MCNC: 8.4 MG/DL (ref 8.5–10.1)
CHLORIDE SERPL-SCNC: 100 MMOL/L (ref 97–108)
CO2 SERPL-SCNC: 33 MMOL/L (ref 21–32)
CREAT SERPL-MCNC: 1.34 MG/DL (ref 0.7–1.3)
DIFFERENTIAL METHOD BLD: ABNORMAL
EOSINOPHIL # BLD: 0 K/UL (ref 0–0.4)
EOSINOPHIL NFR BLD: 0 % (ref 0–7)
ERYTHROCYTE [DISTWIDTH] IN BLOOD BY AUTOMATED COUNT: 13.9 % (ref 11.5–14.5)
GLUCOSE BLD STRIP.AUTO-MCNC: 94 MG/DL (ref 65–117)
GLUCOSE BLD STRIP.AUTO-MCNC: 98 MG/DL (ref 65–117)
GLUCOSE SERPL-MCNC: 93 MG/DL (ref 65–100)
HCT VFR BLD AUTO: 41 % (ref 36.6–50.3)
HGB BLD-MCNC: 12.6 G/DL (ref 12.1–17)
IMM GRANULOCYTES # BLD AUTO: 0 K/UL (ref 0–0.04)
IMM GRANULOCYTES NFR BLD AUTO: 1 % (ref 0–0.5)
LYMPHOCYTES # BLD: 1.8 K/UL (ref 0.8–3.5)
LYMPHOCYTES NFR BLD: 24 % (ref 12–49)
MAGNESIUM SERPL-MCNC: 2 MG/DL (ref 1.6–2.4)
MCH RBC QN AUTO: 27.1 PG (ref 26–34)
MCHC RBC AUTO-ENTMCNC: 30.7 G/DL (ref 30–36.5)
MCV RBC AUTO: 88.2 FL (ref 80–99)
MONOCYTES # BLD: 0.5 K/UL (ref 0–1)
MONOCYTES NFR BLD: 7 % (ref 5–13)
NEUTS SEG # BLD: 5 K/UL (ref 1.8–8)
NEUTS SEG NFR BLD: 68 % (ref 32–75)
NRBC # BLD: 0 K/UL (ref 0–0.01)
NRBC BLD-RTO: 0 PER 100 WBC
PHOSPHATE SERPL-MCNC: 3.3 MG/DL (ref 2.6–4.7)
PLATELET # BLD AUTO: 181 K/UL (ref 150–400)
PMV BLD AUTO: 11.3 FL (ref 8.9–12.9)
POTASSIUM SERPL-SCNC: 3.4 MMOL/L (ref 3.5–5.1)
RBC # BLD AUTO: 4.65 M/UL (ref 4.1–5.7)
SERVICE CMNT-IMP: NORMAL
SODIUM SERPL-SCNC: 139 MMOL/L (ref 136–145)
WBC # BLD AUTO: 7.4 K/UL (ref 4.1–11.1)

## 2024-02-15 PROCEDURE — 80048 BASIC METABOLIC PNL TOTAL CA: CPT

## 2024-02-15 PROCEDURE — 2580000003 HC RX 258: Performed by: STUDENT IN AN ORGANIZED HEALTH CARE EDUCATION/TRAINING PROGRAM

## 2024-02-15 PROCEDURE — 6370000000 HC RX 637 (ALT 250 FOR IP): Performed by: STUDENT IN AN ORGANIZED HEALTH CARE EDUCATION/TRAINING PROGRAM

## 2024-02-15 PROCEDURE — 85025 COMPLETE CBC W/AUTO DIFF WBC: CPT

## 2024-02-15 PROCEDURE — 6370000000 HC RX 637 (ALT 250 FOR IP): Performed by: NURSE PRACTITIONER

## 2024-02-15 PROCEDURE — 36415 COLL VENOUS BLD VENIPUNCTURE: CPT

## 2024-02-15 PROCEDURE — 84100 ASSAY OF PHOSPHORUS: CPT

## 2024-02-15 PROCEDURE — 82962 GLUCOSE BLOOD TEST: CPT

## 2024-02-15 PROCEDURE — 6360000002 HC RX W HCPCS: Performed by: STUDENT IN AN ORGANIZED HEALTH CARE EDUCATION/TRAINING PROGRAM

## 2024-02-15 PROCEDURE — 83735 ASSAY OF MAGNESIUM: CPT

## 2024-02-15 RX ORDER — POTASSIUM CHLORIDE 750 MG/1
40 TABLET, FILM COATED, EXTENDED RELEASE ORAL ONCE
Status: DISCONTINUED | OUTPATIENT
Start: 2024-02-15 | End: 2024-02-15

## 2024-02-15 RX ORDER — LANOLIN ALCOHOL/MO/W.PET/CERES
3 CREAM (GRAM) TOPICAL NIGHTLY PRN
Status: DISCONTINUED | OUTPATIENT
Start: 2024-02-15 | End: 2024-02-15 | Stop reason: HOSPADM

## 2024-02-15 RX ORDER — POTASSIUM CHLORIDE 20 MEQ/1
20 TABLET, EXTENDED RELEASE ORAL ONCE
Status: COMPLETED | OUTPATIENT
Start: 2024-02-15 | End: 2024-02-15

## 2024-02-15 RX ADMIN — APIXABAN 5 MG: 5 TABLET, FILM COATED ORAL at 10:13

## 2024-02-15 RX ADMIN — POTASSIUM CHLORIDE 20 MEQ: 20 TABLET, EXTENDED RELEASE ORAL at 06:58

## 2024-02-15 RX ADMIN — FUROSEMIDE 40 MG: 10 INJECTION, SOLUTION INTRAMUSCULAR; INTRAVENOUS at 10:13

## 2024-02-15 RX ADMIN — ATORVASTATIN CALCIUM 10 MG: 10 TABLET, FILM COATED ORAL at 08:28

## 2024-02-15 RX ADMIN — DOXYCYCLINE HYCLATE 100 MG: 100 TABLET, FILM COATED ORAL at 10:13

## 2024-02-15 RX ADMIN — PANTOPRAZOLE SODIUM 40 MG: 40 TABLET, DELAYED RELEASE ORAL at 08:27

## 2024-02-15 RX ADMIN — SODIUM CHLORIDE, PRESERVATIVE FREE 10 ML: 5 INJECTION INTRAVENOUS at 08:29

## 2024-02-15 RX ADMIN — ESCITALOPRAM OXALATE 5 MG: 10 TABLET ORAL at 08:28

## 2024-02-15 RX ADMIN — METOPROLOL SUCCINATE 12.5 MG: 25 TABLET, EXTENDED RELEASE ORAL at 08:27

## 2024-02-15 RX ADMIN — FERROUS SULFATE TAB 325 MG (65 MG ELEMENTAL FE) 325 MG: 325 (65 FE) TAB at 10:15

## 2024-02-15 NOTE — PLAN OF CARE
Problem: Discharge Planning  Goal: Discharge to home or other facility with appropriate resources  2/10/2024 2205 by Hayden Romo RN  Outcome: Progressing  2/10/2024 0819 by Luz Mcdaniel RN  Outcome: Not Progressing     Problem: Pain  Goal: Verbalizes/displays adequate comfort level or baseline comfort level  2/10/2024 2205 by Hayden Romo RN  Outcome: Progressing  2/10/2024 0819 by Luz Mcdaniel RN  Outcome: Progressing     Problem: ABCDS Injury Assessment  Goal: Absence of physical injury  2/10/2024 2205 by Hayden Romo RN  Outcome: Progressing  2/10/2024 0819 by Luz Mcdaniel RN  Outcome: Progressing     Problem: Skin/Tissue Integrity  Goal: Absence of new skin breakdown  Description: 1.  Monitor for areas of redness and/or skin breakdown  2.  Assess vascular access sites hourly  3.  Every 4-6 hours minimum:  Change oxygen saturation probe site  4.  Every 4-6 hours:  If on nasal continuous positive airway pressure, respiratory therapy assess nares and determine need for appliance change or resting period.  2/10/2024 2205 by Hayden Romo RN  Outcome: Progressing  2/10/2024 0819 by Luz Mcdaniel RN  Outcome: Progressing     Problem: Safety - Adult  Goal: Free from fall injury  2/10/2024 2205 by Hayden Romo RN  Outcome: Progressing  2/10/2024 0819 by Luz Mcdaniel RN  Outcome: Progressing     Problem: Discharge Planning  Goal: Discharge to home or other facility with appropriate resources  2/10/2024 2205 by Hayden Romo RN  Outcome: Progressing  2/10/2024 0819 by Luz Mcdaniel RN  Outcome: Not Progressing     
  Problem: Discharge Planning  Goal: Discharge to home or other facility with appropriate resources  Outcome: Not Progressing     
  Problem: Discharge Planning  Goal: Discharge to home or other facility with appropriate resources  Outcome: Progressing     Problem: Pain  Goal: Verbalizes/displays adequate comfort level or baseline comfort level  Outcome: Progressing     Problem: ABCDS Injury Assessment  Goal: Absence of physical injury  Outcome: Progressing     Problem: Skin/Tissue Integrity  Goal: Absence of new skin breakdown  Description: 1.  Monitor for areas of redness and/or skin breakdown  2.  Assess vascular access sites hourly  3.  Every 4-6 hours minimum:  Change oxygen saturation probe site  4.  Every 4-6 hours:  If on nasal continuous positive airway pressure, respiratory therapy assess nares and determine need for appliance change or resting period.  Outcome: Progressing     Problem: Safety - Adult  Goal: Free from fall injury  Outcome: Progressing     Problem: Chronic Conditions and Co-morbidities  Goal: Patient's chronic conditions and co-morbidity symptoms are monitored and maintained or improved  Outcome: Progressing     
  Problem: Discharge Planning  Goal: Discharge to home or other facility with appropriate resources  Outcome: Progressing     Problem: Pain  Goal: Verbalizes/displays adequate comfort level or baseline comfort level  Outcome: Progressing     Problem: ABCDS Injury Assessment  Goal: Absence of physical injury  Outcome: Progressing     Problem: Skin/Tissue Integrity  Goal: Absence of new skin breakdown  Description: 1.  Monitor for areas of redness and/or skin breakdown  2.  Assess vascular access sites hourly  3.  Every 4-6 hours minimum:  Change oxygen saturation probe site  4.  Every 4-6 hours:  If on nasal continuous positive airway pressure, respiratory therapy assess nares and determine need for appliance change or resting period.  Outcome: Progressing     Problem: Safety - Adult  Goal: Free from fall injury  Outcome: Progressing     Problem: Chronic Conditions and Co-morbidities  Goal: Patient's chronic conditions and co-morbidity symptoms are monitored and maintained or improved  Outcome: Progressing     
  Problem: Discharge Planning  Goal: Discharge to home or other facility with appropriate resources  Outcome: Progressing  Flowsheets (Taken 2/11/2024 0900 by Lisa Montalvo RN)  Discharge to home or other facility with appropriate resources:   Identify barriers to discharge with patient and caregiver   Arrange for needed discharge resources and transportation as appropriate   Identify discharge learning needs (meds, wound care, etc)   Refer to discharge planning if patient needs post-hospital services based on physician order or complex needs related to functional status, cognitive ability or social support system     Problem: Pain  Goal: Verbalizes/displays adequate comfort level or baseline comfort level  Outcome: Progressing  Flowsheets  Taken 2/11/2024 1625 by Lisa Montalvo RN  Verbalizes/displays adequate comfort level or baseline comfort level:   Encourage patient to monitor pain and request assistance   Assess pain using appropriate pain scale  Taken 2/11/2024 1115 by Lisa Montalvo RN  Verbalizes/displays adequate comfort level or baseline comfort level:   Encourage patient to monitor pain and request assistance   Assess pain using appropriate pain scale     Problem: ABCDS Injury Assessment  Goal: Absence of physical injury  Outcome: Progressing     Problem: Skin/Tissue Integrity  Goal: Absence of new skin breakdown  Description: 1.  Monitor for areas of redness and/or skin breakdown  2.  Assess vascular access sites hourly  3.  Every 4-6 hours minimum:  Change oxygen saturation probe site  4.  Every 4-6 hours:  If on nasal continuous positive airway pressure, respiratory therapy assess nares and determine need for appliance change or resting period.  Outcome: Progressing     Problem: Safety - Adult  Goal: Free from fall injury  Outcome: Progressing  Flowsheets (Taken 2/11/2024 0900 by Lisa Montalvo RN)  Free From Fall Injury: Instruct family/caregiver on patient safety     Problem: Physical 
  Problem: Discharge Planning  Goal: Discharge to home or other facility with appropriate resources  Outcome: Progressing  Flowsheets (Taken 2/13/2024 0800 by Venancio Velasquez RN)  Discharge to home or other facility with appropriate resources:   Identify barriers to discharge with patient and caregiver   Arrange for needed discharge resources and transportation as appropriate     Problem: Pain  Goal: Verbalizes/displays adequate comfort level or baseline comfort level  Outcome: Progressing     Problem: ABCDS Injury Assessment  Goal: Absence of physical injury  Outcome: Progressing     Problem: Skin/Tissue Integrity  Goal: Absence of new skin breakdown  Description: 1.  Monitor for areas of redness and/or skin breakdown  2.  Assess vascular access sites hourly  3.  Every 4-6 hours minimum:  Change oxygen saturation probe site  4.  Every 4-6 hours:  If on nasal continuous positive airway pressure, respiratory therapy assess nares and determine need for appliance change or resting period.  Outcome: Progressing     Problem: Safety - Adult  Goal: Free from fall injury  Outcome: Progressing     Problem: Physical Therapy - Adult  Goal: By Discharge: Performs mobility at highest level of function for planned discharge setting.  See evaluation for individualized goals.  Description: FUNCTIONAL STATUS PRIOR TO ADMISSION: patient required assistance for mobility, using rollator for short distance in the home. He was receiving HH PT/OT after recently discharging from SNF.     HOME SUPPORT PRIOR TO ADMISSION: The patient lived with his son and family who provide assistance as needed. They have baby monitors and provide 24/7 assistance/care.     Physical Therapy Goals  Initiated 2/11/2024  1.  Patient will move from supine to sit and sit to supine, scoot up and down, and roll side to side in bed with supervision/set-up within 7 day(s).    2.  Patient will perform sit to stand with supervision/set-up within 7 day(s).  3.  Patient 
  Problem: Occupational Therapy - Adult  Goal: By Discharge: Performs self-care activities at highest level of function for planned discharge setting.  See evaluation for individualized goals.  Description: FUNCTIONAL STATUS PRIOR TO ADMISSION:    Receives Help From: Family, Home health, ADL Assistance: Needs assistance,  ,  ,  ,  , Toileting: Needs assistance, Homemaking Assistance: Needs assistance, Ambulation Assistance: Needs assistance, Transfer Assistance: Needs assistance, Active : No   Recently discharged from SNF where he went for debility related to 4 rib fractures  post fall 24 and infected pacemaker;   Lives with son and DIL and also his wife's mother (currently in this hospital.) Baby monitors used by family to allow increased S of patient; Patient's spouse recently  so son/family moved him here from [facility] ~ . Reports his current soft voice is not normal- \"only since covid.\"  Occupational Therapy Goals:  Initiated 2024  1.  Patient will perform grooming in standing VSS with Contact Guard Assist within 7 day(s).  2.  Patient will perform upper body dressing with Stand by Assist within 7 day(s).  3.  Patient will perform lower body dressing with Supervision within 7 day(s).  4.  Patient will perform toilet transfers with Supervision  within 7 day(s).  5.  Patient will perform all aspects of toileting with Supervision within 7 day(s).  6.  Patient will participate in upper extremity therapeutic exercise/activities including use of incentive spirometer with Supervision for 5 minutes within 7 day(s).    7.  Patient will utilize energy conservation, fall prevention and PLB techniques during functional activities with verbal cues within 7 day(s).   Outcome: Progressing   OCCUPATIONAL THERAPY EVALUATION    Patient: Jack Hobbs (89 y.o. male)  Date: 2024  Primary Diagnosis: Acute respiratory distress [R06.03]  Acute pulmonary edema (HCC) [J81.0]  CHF (congestive heart 
  Problem: Physical Therapy - Adult  Goal: By Discharge: Performs mobility at highest level of function for planned discharge setting.  See evaluation for individualized goals.  Description: FUNCTIONAL STATUS PRIOR TO ADMISSION: patient required assistance for mobility, using rollator for short distance in the home. He was receiving HH PT/OT after recently discharging from SNF.     HOME SUPPORT PRIOR TO ADMISSION: The patient lived with his son and family who provide assistance as needed. They have baby monitors and provide 24/7 assistance/care.     Physical Therapy Goals  Initiated 2/11/2024  1.  Patient will move from supine to sit and sit to supine, scoot up and down, and roll side to side in bed with supervision/set-up within 7 day(s).    2.  Patient will perform sit to stand with supervision/set-up within 7 day(s).  3.  Patient will transfer from bed to chair and chair to bed with supervision/set-up using the least restrictive device within 7 day(s).  4.  Patient will ambulate with supervision/set-up for 75 feet with the least restrictive device within 7 day(s).   5.  Patient will ascend/descend 3 stairs with one sided handrail(s) with supervision/set-up within 7 day(s).   2/13/2024 1403 by Betty Penn, PT  Outcome: Progressing  2/13/2024 1324 by Betty Penn, PT  Outcome: Progressing   PHYSICAL THERAPY TREATMENT    Patient: Jack Hobbs (89 y.o. male)  Date: 2/13/2024  Diagnosis: Acute respiratory distress [R06.03]  Acute pulmonary edema (HCC) [J81.0]  CHF (congestive heart failure), NYHA class I, acute on chronic, combined (HCC) [I50.43]  COVID-19 [U07.1] CHF (congestive heart failure), NYHA class I, acute on chronic, combined (HCC)      Precautions: Fall Risk, General Precautions, Bed Alarm, Contact Precautions (Droplet+ covid+; recent Pacemaker infection; fall 1-9-24 with rib fx 6-9; sacral wound; limited code; wife recently passed)                      ASSESSMENT:  Patient continues to benefit from 
  Problem: Physical Therapy - Adult  Goal: By Discharge: Performs mobility at highest level of function for planned discharge setting.  See evaluation for individualized goals.  Description: FUNCTIONAL STATUS PRIOR TO ADMISSION: patient required assistance for mobility, using rollator for short distance in the home. He was receiving HH PT/OT after recently discharging from SNF.     HOME SUPPORT PRIOR TO ADMISSION: The patient lived with his son and family who provide assistance as needed. They have baby monitors and provide 24/7 assistance/care.     Physical Therapy Goals  Initiated 2/11/2024  1.  Patient will move from supine to sit and sit to supine, scoot up and down, and roll side to side in bed with supervision/set-up within 7 day(s).    2.  Patient will perform sit to stand with supervision/set-up within 7 day(s).  3.  Patient will transfer from bed to chair and chair to bed with supervision/set-up using the least restrictive device within 7 day(s).  4.  Patient will ambulate with supervision/set-up for 75 feet with the least restrictive device within 7 day(s).   5.  Patient will ascend/descend 3 stairs with one sided handrail(s) with supervision/set-up within 7 day(s).   Outcome: Progressing   PHYSICAL THERAPY EVALUATION    Patient: Jack Hobbs (89 y.o. male)  Date: 2/11/2024  Primary Diagnosis: Acute respiratory distress [R06.03]  Acute pulmonary edema (HCC) [J81.0]  CHF (congestive heart failure), NYHA class I, acute on chronic, combined (HCC) [I50.43]  COVID-19 [U07.1]       Precautions: Restrictions/Precautions: Bed Alarm                      ASSESSMENT :   DEFICITS/IMPAIRMENTS:   The patient is limited by decreased functional mobility, independence in ADLs, strength, activity tolerance, endurance, balance, and unsteady gait following admission for acute respiratory distress. Patient required overall CGA and additional time for all mobility. He is hypotensive, although steady with all positional 
to fatigue but agreeable to transfer to chair after education provided regarding importance of being out of bed.  Patient requires CGA for bed mobility and transfers, and takes a few steps with walker to chair.  BP with slight drop after transfer (92/53 initially sitting to 83/47 once up in chair). BP increases to 101/58 after sitting 2 minutes and performing ankle pumps.  Up in chair at end of session with RN aware of patient up in room and alarm set.         PLAN:  Patient continues to benefit from skilled intervention to address the above impairments.  Continue treatment per established plan of care.    Recommendation for discharge: (in order for the patient to meet his/her long term goals): Therapy 2 days/week in the home and also see \"other factors to consider\" below for additional discharge concerns/needs    Other factors to consider for discharge: high risk for falls    IF patient discharges home will need the following DME: patient owns DME required for discharge       SUBJECTIVE:   Patient stated, \"I'm tired.\"    OBJECTIVE DATA SUMMARY:   Critical Behavior:  Orientation  Overall Orientation Status: Within Normal Limits  Orientation Level: Oriented to place;Oriented to person;Oriented to situation  Cognition  Overall Cognitive Status: WFL    Functional Mobility Training:  Bed Mobility:  Bed Mobility Training  Supine to Sit: Contact-guard assistance;Additional time;Assist X1  Scooting: Contact-guard assistance  Transfers:  Transfer Training  Transfer Training: Yes  Interventions: Verbal cues  Sit to Stand: Contact-guard assistance  Stand to Sit: Contact-guard assistance  Bed to Chair: Contact-guard assistance;Assist X1  Balance:  Balance  Sitting: Intact  Sitting - Static: Good (unsupported)  Sitting - Dynamic: Good (unsupported)  Standing: Impaired  Standing - Static: Constant support;Fair  Standing - Dynamic: Fair;Constant support   Ambulation/Gait Training:     Gait  Distance (ft): 5 Feet  Assistive

## 2024-02-15 NOTE — DISCHARGE SUMMARY
Discharge Summary    Name: Jack Hobbs  [id number]  YOB: 1933 (Age: 89 y.o.)   Date of Admission: 2/9/2024  Date of Discharge: 2/15/2024  Attending Physician: No att. providers found    Discharge Diagnosis:     Acute respiratory failure  Acute on chronic systolic heart failure, s/p pacemaker  R pleural effusion chronic   COVID positive  pAfib on AC  Bioprothetic valve  HTN  Recent rib fractures  Hx of pacemaker lead infection  DM  HLD    Consultations:  IP CONSULT TO HOSPITALIST  IP WOUND CARE NURSE CONSULT TO EVAL  IP CONSULT TO INFECTIOUS DISEASES  IP CONSULT TO CARDIOLOGY  IP CONSULT TO CASE MANAGEMENT  IP CONSULT TO PULMONOLOGY      Brief Admission History/Reason for Admission Per David L Mendel, MD:   Jack Hobbs is a 89 y.o.  male with PMHx significant for the above presents with 3 days of worsening dyspnea with orthopnea and chest tightness. Denies chest pain on my eval. Son notes patient had difficulty sleeping with wheezing as well. Son needed to prop patient up in bed overnight. Home health PT saw him this morning and noted he was satting 88 while seated on room air.  No chest pain, no pleurisy.  No abdominal pain, no nausea and vomiting. Recently discharged [facility] after hospitalization with broken ribs.  We were asked to admit for work up and evaluation of the above problems.     Brief Hospital Course by Main Problems:     Acute respiratory failure  Acute on chronic systolic heart failure, s/p pacemaker  R pleural effusion chronic  COVID infection  pAfib on AC  Bioprothetic valve  HTN  -No chest pain. Has worsening dyspnea and orthopnea. BNP elevated. EKG V paced, no acute changes. CXR with minimal pulmonary edema. Persistent right pleural effusion with underlying atelectasis.  Patient received IV Lasix with improvement of symptoms.  Was continued on metoprolol.  Cardiology consultation was placed.  He had an echocardiogram which showed

## 2024-02-15 NOTE — PROGRESS NOTES
Hospitalist Progress Note    NAME:   Jack Hobbs   : 1933   MRN: [id number]     Date/Time: 2024 2:55 PM  Patient PCP: Benjamin David MD    Estimated discharge date: 2/15  Barriers: Echo, pulmonary evaluation, improvement of right pleural effusion      Assessment / Plan:    Acute respiratory failure  Acute on chronic systolic heart failure, s/p pacemaker  R pleural effusion chronic  COVID infection  pAfib on AC  Bioprothetic valve  HTN  -No chest pain. Has worsening dyspnea and orthopnea. BNP elevated. EKG V paced, no acute changes. CXR with minimal pulmonary edema. Persistent right pleural effusion with underlying atelectasis.  -Continue Lasix 40 mg IV twice daily.  Continue metoprolol.  Strict I's and O's, daily weights and low-salt diet.  Patient is currently on room air and with good symptomatic improvement.  Pending echocardiogram.  Cardiology recommendations noted  -No Decadron since he is currently on room air for COVID.  Consider Paxlovid at discharge  -Continue Eliquis for atrial fibrillation along with metoprolol.  -Discussed his chronic right pleural effusion with patient's son and he reports that it has been chronic and did not get better and wants inpatient pulmonary evaluation.  Will consult pulmonary.  Repeat chest x-ray as well.     Recent rib fractures  - home lidocaine patch, tylenol     Hx of pacemaker lead infection  -Patient's son was bedside and discussed with him details about pacemaker infection.  -Patient had a pacemaker placed back in  in [place] and it was infected in October and it was decided by his cardiologist in [place] to put him on doxycycline for life since removing the pacemaker would be risky  -Continue PTA doxycycline.  -Son requested ID consult.  I consulted ID but Dr. Tran and his team are very busy and cannot accommodate this consult at this time and recommend outpatient follow-up    Hypokalemia  -KCl replaced.  Recheck in a.m.       DM  -continue 
      Hospitalist Progress Note    NAME:   Jack Hobbs   : 1933   MRN: [id number]     Date/Time: 2024 2:52 PM  Patient PCP: Benjamin David MD    Estimated discharge date:   Barriers: ID evaluation      Assessment / Plan:            Acute respiratory failure  Acute on chronic systolic heart failure, s/p pacemaker  R pleural effusion  COVID positive  pAfib on AC  Bioprothetic valve  HTN  No chest pain. Has worsening dyspnea and orthopnea. BNP elevated. EKG V paced, no acute changes. CXR with minimal pulmonary edema. Persistent right pleural effusion with underlying atelectasis.  -Continue Lasix 40 mg IV twice daily.  Continue metoprolol.  Strict I's and O's, daily weights and low-salt diet.  Patient is currently on room air and with good symptomatic improvement.  2D echo pending  -No Decadron since he is currently on room air for COVID.  Consider Paxlovid.  -Continue Eliquis for atrial fibrillation along with metoprolol.       Recent rib fractures  - home lidocaine patch, tylenol     Hx of pacemaker lead infection  -Patient's son was bedside and discussed with him details about pacemaker infection.  -Patient had a pacemaker placed back in  in [place] and it was infected in October and it was decided by his cardiologist in [place] to put him on doxycycline for life since removing the pacemaker would be risky  -Son is now wanting an inpatient ID consultation for recommendations.  Will consult ID.  -Continue PTA doxycycline.         DM-continue sliding scale insulin with blood sugar checks    HLD-continue Lipitor       Sacral wound POA  -Continue wound care     GERD  Iron def  ?Hx of Stomach cancer  Mood  - home PPI, iron supplements, lexapro            Medical Decision Making:   I personally reviewed labs:cbc, BMP  I personally reviewed imaging:  I personally reviewed EKG:  Toxic drug monitoring:   Discussed case with: Pharmacy,  and son        Code Status: full  DVT 
      Hospitalist Progress Note    NAME:   Jack Hobbs   : 1933   MRN: [id number]     Date/Time: 2024 6:07 PM  Patient PCP: Benjamin David MD    Estimated discharge date: 24  Barriers: clinical improvement, iv diuresis, PT/OT consult      Assessment / Plan:            -Continue Lasix for CHF.  Continue doxycycline.  Check CBC and BMP in a.m. tomorrow.  He is currently on room air.    Acute respiratory failure, EF 20-25  Acute on chronic systolic heart failure, s/p pacemaker  R pleural effusion  COVID positive  pAfib on AC  Bioprothetic valve  HTN  No chest pain. Has worsening dyspnea and orthopnea. BNP elevated. EKG V paced, no acute changes. CXR with minimal pulmonary edema. Persistent right pleural effusion with underlying atelectasis.  - Trop 118 -> 116, likely type II MI- no chest pain.  - received lasix 80 IV in ed, will continue with 40 IV bid  - daily weight, strict I/O  - continue home metop, apixaban  - satting well on room air, hold off on steroids  - tele     Recent rib fractures  - home lidocaine patch, tylenol     Hx of pacemaker lead infection?  - continue home doxycycline  - if still here on Monday, would like to see ID     DM  ?HLD  - not on meds  - added SSI  - home statin     Sacral wound POA  - consulted wound care     GERD  Iron def  ?Hx of Stomach cancer  Mood  - home PPI, iron supplements, lexapro            Medical Decision Making:   I personally reviewed labs:cbc, BMP: potassium 3.6, magnesium 1.8- will replete potassium and magnesium as patient is getting diuresis. Goal potassium>4 and goal magnesium>2  I personally reviewed imaging:  I personally reviewed EKG:  Toxic drug monitoring:   Discussed case with:         Code Status: full  DVT Prophylaxis:eliquis   GI Prophylaxis:    Subjective:     Chief Complaint / Reason for Physician Visit  Patient reports fatigue      Objective:     VITALS:   Last 24hrs VS reviewed since prior progress note. Most recent are:  Patient 
      Hospitalist Progress Note    NAME:   Jack Hobbs   : 1933   MRN: [id number]     Date/Time: 2/10/2024 2:11 PM  Patient PCP: Benjamin David MD    Estimated discharge date: 24  Barriers: clinical improvement, iv diuresis, PT/OT consult      Assessment / Plan:  Acute respiratory failure, EF 20-25  Acute on chronic systolic heart failure, s/p pacemaker  R pleural effusion  COVID positive  pAfib on AC  Bioprothetic valve  HTN  No chest pain. Has worsening dyspnea and orthopnea. BNP elevated. EKG V paced, no acute changes. CXR with minimal pulmonary edema. Persistent right pleural effusion with underlying atelectasis.  - Trop 118 -> 116, likely type II MI- no chest pain.  - received lasix 80 IV in ed, will continue with 40 IV bid  - daily weight, strict I/O  - continue home metop, apixaban  - satting well on room air, hold off on steroids  - tele     Recent rib fractures  - home lidocaine patch, tylenol     Hx of pacemaker lead infection?  - continue home doxycycline  - if still here on Monday, would like to see ID     DM  ?HLD  - not on meds  - added SSI  - home statin     Sacral wound POA  - consulted wound care     GERD  Iron def  ?Hx of Stomach cancer  Mood  - home PPI, iron supplements, lexapro            Medical Decision Making:   I personally reviewed labs:cbc, BMP: potassium 3.6, magnesium 1.8- will replete potassium and magnesium as patient is getting diuresis. Goal potassium>4 and goal magnesium>2  I personally reviewed imaging:  I personally reviewed EKG:  Toxic drug monitoring:   Discussed case with:         Code Status: full  DVT Prophylaxis:eliquis   GI Prophylaxis:    Subjective:     Chief Complaint / Reason for Physician Visit  Patient reports fatigue      Objective:     VITALS:   Last 24hrs VS reviewed since prior progress note. Most recent are:  Patient Vitals for the past 24 hrs:   BP Temp Temp src Pulse Resp SpO2 Height Weight   02/10/24 1202 107/63 97.3 °F (36.3 °C) Oral 78 17 94 % 
      Hospitalist Progress Note    NAME:   Jack Hobbs   : 1933   MRN: [id number]     Date/Time: 2024 4:07 PM  Patient PCP: Benjamin David MD    Estimated discharge date:   Barriers: Echo, cardiology evaluation      Assessment / Plan:    Acute respiratory failure  Acute on chronic systolic heart failure, s/p pacemaker  R pleural effusion  COVID positive  pAfib on AC  Bioprothetic valve  HTN  No chest pain. Has worsening dyspnea and orthopnea. BNP elevated. EKG V paced, no acute changes. CXR with minimal pulmonary edema. Persistent right pleural effusion with underlying atelectasis.  -Continue Lasix 40 mg IV twice daily.  Continue metoprolol.  Strict I's and O's, daily weights and low-salt diet.  Patient is currently on room air and with good symptomatic improvement.  Pending echocardiogram.  Pending cardiology evaluation  -No Decadron since he is currently on room air for COVID.  Consider Paxlovid.  -Continue Eliquis for atrial fibrillation along with metoprolol.       Recent rib fractures  - home lidocaine patch, tylenol     Hx of pacemaker lead infection  -Patient's son was bedside and discussed with him details about pacemaker infection.  -Patient had a pacemaker placed back in  in [place] and it was infected in October and it was decided by his cardiologist in [place] to put him on doxycycline for life since removing the pacemaker would be risky  -Continue PTA doxycycline.  -Son requested ID consult.  I consulted ID but Dr. Tran and his team are very busy and cannot accommodate this consult at this time and recommend outpatient follow-up       DM  -continue sliding scale insulin with blood sugar checks    HLD  -continue Lipitor       Sacral wound POA  -Continue wound care     GERD  Iron def  ?Hx of Stomach cancer  Mood  - home PPI, iron supplements, lexapro            Medical Decision Making:   I personally reviewed labs:cbc, BMP  I personally reviewed imaging:  I personally reviewed 
  Physician Progress Note      PATIENT:               NATHANIEL RAMOS  CSN #:                  [id number]  :                       1933  ADMIT DATE:       2024 9:58 AM  DISCH DATE:        2/15/2024 2:16 PM  RESPONDING  PROVIDER #:        Chidi Mayberry MD          QUERY TEXT:    Patient admitted with SOB and Chest tightness. Per H&P, noted to also have   Sacral wound POA. Per Wound Care RN consult note dated , patient noted to   have \"POA Stage 4 sacral wound measuring 1.5 x 1 x 1 cm per home health   medical records 24 and 24 found in chart review\". If possible, please   document in progress notes and discharge summary the stage of the pressure   ulcer:    The medical record reflects the following:  Risk Factors: Hx: DM  Clinical Indicators:     Wound Care RN PN: \"Patient has a POA Stage 4 sacral wound measuring 1.5 x   1 x 1 cm per home health medical records 24 and 24 found in chart   review. Wound care orders for [facility] include Vashe for cleansing and Plurogel    wound dressing for moist to dry wound care. We do not carry Plurogel at this   facility\".    \"Sacral wound: daily, cleanse with NS flush of 5-10 mls, wipe clean. Pack   wound with NS moist -\" plain packing and cover with a foam dressing.   Date and time each dressing\".    Treatment: Wound Care consult; Wound care    Thank you,    Karrie Blount  CDI  [email or web address]    Stage 1:  Non-blanchable erythema of intact skin  Stage 2:  Abrasion, Blister, Partial-thickness skin loss, with exposed dermis  Stage 3:  Full-thickness skin loss with damage or necrosis of subcutaneous   tissue  Stage 4:  Full-thickness skin & soft tissue loss through to underlying muscle,   tendon or bone  Unstageable: Obscured full-thickness skin & tissue loss  Options provided:  -- Stage 4 Pressure Ulcer of Sacrum present on admission  -- Other - I will add my own diagnosis  -- Disagree - Not applicable / Not valid  -- Disagree - Clinically 
1900 Bedside and Verbal shift change report given to Hayden ACRLOS (oncoming nurse) by Collin RN (offgoing nurse). Report included the following information Nurse Handoff Report, MAR, Recent Results, and Cardiac Rhythm AV Paced .     End of Shift Note    Bedside shift change report given to Collin CARLOS (oncoming nurse) by Hayden Romo RN (offgoing nurse).  Report included the following information SBAR, MAR, Recent Results, and Cardiac Rhythm AV Paced    Shift worked:  7pm-7am     Shift summary and any significant changes:     Morning bloods collected and sent to lab     Concerns for physician to address:        Zone phone for oncoming shift:           Activity:     Number times ambulated in hallways past shift: 0  Number of times OOB to chair past shift: 0    Cardiac:   Cardiac Monitoring: Yes           Access:  Current line(s): PIV     Genitourinary:   Urinary status: voiding         GI:     Current diet:  ADULT DIET; Regular; 3 carb choices (45 gm/meal); Low Fat/Low Chol/High Fiber/2 gm Na  ADULT ORAL NUTRITION SUPPLEMENT; Breakfast, Dinner; Diabetic Oral Supplement  Passing flatus: YES  Tolerating current diet: YES       Pain Management:   Patient states pain is manageable on current regimen: YES    Skin:     Interventions: specialty bed, float heels, increase time out of bed, foam dressing, PT/OT consult, limit briefs, internal/external urinary devices, and nutritional support    Patient Safety:  Fall Score:    Interventions: bed/chair alarm, assistive device (walker, cane. etc), gripper socks, pt to call before getting OOB, stay with me (per policy), and gait belt       Length of Stay:  Expected LOS: 4  Actual LOS: 3      Hayden Romo RN                            
1900 Bedside and Verbal shift change report given to Hayden CARLOS (oncoming nurse) by Collin CARLOS (offgoing nurse). Report included the following information Nurse Handoff Report, MAR, Recent Results, and Cardiac Rhythm AV Paced .     0220 Message MELISA Estrada -  \"Pt told the PCT that \"their are people moving around the room\" (He is alone and on droplet isolation). He is calm and resting as of the moment but just want to inform that is something new for him since I have him for a couple of days. Although he is 89 years old but no history of dementia on chart.\"    0250 New orders of PRN melatonin 3mg ordered in system by MELISA Estrada.    0607 Pt latest potassium is 3.4, BUN is 39 & Creatinine 1.34. MELISA Estrada informed.    End of Shift Note    Bedside shift change report given to Екатерина CARLOS (oncoming nurse) by Hayden Romo RN (offgoing nurse).  Report included the following information SBAR, MAR, Recent Results, and Cardiac Rhythm AV Paced    Shift worked:  7pm-7am     Shift summary and any significant changes:      Morning bloods collected and sent to lab    See above   Concerns for physician to address:        Zone phone for oncoming shift:           Activity:     Number times ambulated in hallways past shift: 0  Number of times OOB to chair past shift: 0    Cardiac:   Cardiac Monitoring: Yes           Access:  Current line(s): PIV     Genitourinary:   Urinary status: voiding and external catheter      GI:     Current diet:  ADULT DIET; Regular; 3 carb choices (45 gm/meal); Low Fat/Low Chol/High Fiber/2 gm Na  ADULT ORAL NUTRITION SUPPLEMENT; Breakfast, Dinner; Diabetic Oral Supplement  Passing flatus: YES  Tolerating current diet: YES       Pain Management:   Patient states pain is manageable on current regimen: YES    Skin:     Interventions: specialty bed, float heels, increase time out of bed, foam dressing, PT/OT consult, limit briefs, internal/external urinary devices, and nutritional support    Patient Safety:  Fall 
1900 Bedside and Verbal shift change report given to Hayden CARLOS (oncoming nurse) by Collin RN (offgoing nurse). Report included the following information Nurse Handoff Report, MAR, Recent Results, and Cardiac Rhythm AV Paced .     0636 Latest potassium is 3.3 and BUN is 40. MELISA Estrada informed with new orders @ 0651.    End of Shift Note    Bedside shift change report given to Collin CARLOS (oncoming nurse) by Hayden Romo RN (offgoing nurse).  Report included the following information SBAR, MAR, Recent Results, and Cardiac Rhythm AV Paced    Shift worked:  7pm-7am     Shift summary and any significant changes:     Morning bloods collected and sent to lab.    See above   Concerns for physician to address:        Zone phone for oncoming shift:           Activity:     Number times ambulated in hallways past shift: 0  Number of times OOB to chair past shift: 0    Cardiac:   Cardiac Monitoring: Yes           Access:  Current line(s): PIV     Genitourinary:   Urinary status: voiding      GI:     Current diet:  ADULT DIET; Regular; 3 carb choices (45 gm/meal); Low Fat/Low Chol/High Fiber/2 gm Na  ADULT ORAL NUTRITION SUPPLEMENT; Breakfast, Dinner; Diabetic Oral Supplement  Passing flatus: YES  Tolerating current diet: YES       Pain Management:   Patient states pain is manageable on current regimen: YES    Skin:     Interventions: specialty bed, float heels, increase time out of bed, foam dressing, PT/OT consult, limit briefs, internal/external urinary devices, and nutritional support    Patient Safety:  Fall Score:    Interventions: bed/chair alarm, assistive device (walker, cane. etc), gripper socks, pt to call before getting OOB, stay with me (per policy), and gait belt       Length of Stay:  Expected LOS: 5  Actual LOS: 4      Hayden Romo, RN                            
1900 Bedside and Verbal shift change report given to Hayden CARLOS (oncoming nurse) by Lisa (offgoing nurse). Report included the following information Nurse Handoff Report, MAR, Recent Results, and Cardiac Rhythm AV Paced .     End of Shift Note    Bedside shift change report given to Collin CARLOS (oncoming nurse) by Hayden Romo RN (offgoing nurse).  Report included the following information SBAR, MAR, Recent Results, and Cardiac Rhythm AV Paced    Shift worked:  7pm-7am     Shift summary and any significant changes:    Morning bloods collected and sent to lab   Concerns for physician to address:        Zone phone for oncoming shift:           Activity:     Number times ambulated in hallways past shift: 0  Number of times OOB to chair past shift: 0    Cardiac:   Cardiac Monitoring: Yes           Access:  Current line(s): PIV     Genitourinary:   Urinary status: voiding    GI:     Current diet:  ADULT DIET; Regular; 3 carb choices (45 gm/meal); Low Fat/Low Chol/High Fiber/2 gm Na  Passing flatus: YES  Tolerating current diet: YES       Pain Management:   Patient states pain is manageable on current regimen: YES    Skin:     Interventions: specialty bed, float heels, increase time out of bed, foam dressing, PT/OT consult, limit briefs, internal/external urinary devices, and nutritional support    Patient Safety:  Fall Score:    Interventions: bed/chair alarm, assistive device (walker, cane. etc), gripper socks, pt to call before getting OOB, stay with me (per policy), and gait belt       Length of Stay:  Expected LOS: 3  Actual LOS: 2      Hayden Romo RN                            
1900 Bedside and Verbal shift change report given to Hayden CARLOS (oncoming nurse) by Luz CARLOS (offgoing nurse). Report included the following information Nurse Handoff Report, MAR, Recent Results, and Cardiac Rhythm AV Paced .     2045 Pt request a pain medicine for left rib fracture (Recent fall in [facility] care as per patient 4 rib fracture and informed also by morning shift) with pain scale of 7/10. Informed Dr. Larios and has ordered Oxycodone 5mg PO to give @ 2048 but not yet place an order in the system. Followed up the message at 2109 and was seen but still no orders in the system.    2114 Doctor would not want to prescribed Opiods for elderly pt and would prescribe Lidocaine patch instead (Awaiting order).    2215 As per Dr. Larios perfect serve order (Confirmed) I may apply Lidocaine patch.    End of Shift Note    Bedside shift change report given to Lisa CARLOS (oncoming nurse) by Hayden Romo RN (offgoing nurse).  Report included the following information SBAR, MAR, Recent Results, and Cardiac Rhythm AV Paced    Shift worked:  7pm-7am     Shift summary and any significant changes:    Morning bloods collected and sent to lab    See above   Concerns for physician to address:        Zone phone for oncoming shift:           Activity:     Number times ambulated in hallways past shift: 0  Number of times OOB to chair past shift: 0    Cardiac:   Cardiac Monitoring: Yes           Access:  Current line(s): PIV     Genitourinary:   Urinary status: voiding      GI:     Current diet:  ADULT DIET; Regular; 3 carb choices (45 gm/meal); Low Fat/Low Chol/High Fiber/2 gm Na  Passing flatus: YES  Tolerating current diet: YES       Pain Management:   Patient states pain is manageable on current regimen: YES    Skin:     Interventions: specialty bed, float heels, increase time out of bed, foam dressing, PT/OT consult, limit briefs, internal/external urinary devices, and nutritional support    Patient Safety:  Fall Score:  
Bedside and Verbal shift change report given to Luz CARLOS (oncoming nurse) by Kacey CARLOS (offgoing nurse). Report included the following information Nurse Handoff Report, Adult Overview, Surgery Report, MAR, and Recent Results.     End of Shift Note    Bedside shift change report given to Hayden CARLOS (oncoming nurse) by Luz Mcdaniel RN (offgoing nurse).  Report included the following information SBAR, MAR, Accordion, and Recent Results    Shift worked:  7-7     Shift summary and any significant changes:          Concerns for physician to address:       Zone phone for oncoming shift:          Activity:     Number times ambulated in hallways past shift: 0  Number of times OOB to chair past shift: 0    Cardiac:   Cardiac Monitoring: Yes           Access:  Current line(s): PIV     Genitourinary:   Urinary status: voiding and external catheter    Respiratory:      Chronic home O2 use?: NO  Incentive spirometer at bedside: NO       GI:     Current diet:  ADULT DIET; Regular; 3 carb choices (45 gm/meal); Low Fat/Low Chol/High Fiber/2 gm Na  Passing flatus: YES  Tolerating current diet: YES       Pain Management:   Patient states pain is manageable on current regimen: YES    Skin:     Interventions: float heels and foam dressing    Patient Safety:  Fall Score:    Interventions: bed/chair alarm       Length of Stay:  Expected LOS: 3  Actual LOS: 1      Luz Mcdaniel RN                            
Bedside and Verbal shift change report given to Luz RN (oncoming nurse) by SHALONDA Olivera RN (offgoing nurse).  Report given with SBAR, Kardex, Intake/Output, MAR and Recent Results.    
Comprehensive Nutrition Assessment    Type and Reason for Visit:  Initial, Wound    Nutrition Recommendations/Plan:   Continue current diet  Ensure high protein or Glucerna BID to support wound healing  Please document % meals and supplements consumed in flowsheet I/O's under intake      Malnutrition Assessment:  Malnutrition Status:  Insufficient data (02/12/24 1532)        Nutrition Assessment:     Chart reviewed for documented wound. Pt medically noted for acute hypoxic respiratory failure, CHF, pleural effusion, COVID, HTN, recent rib fractures, DM, sacral wound, GERD, iron deficiency, hx stomach ca. Pt being seen by [facility] at time of RD visit. MST negative for malnutrition risk factors PTA. Will add supplements to support wound healing and continue monitoring.     No PO intake documented.     Wt Readings from Last 5 Encounters:   02/12/24 69.6 kg (153 lb 7 oz)   02/08/24 68.5 kg (151 lb)   01/09/24 73.1 kg (161 lb 2.5 oz)   01/02/24 67.1 kg (148 lb)   12/28/23 67.7 kg (149 lb 3.2 oz)   ]    Nutrition Related Findings:    Labs: reviewed.   Meds: lipitor, doxycycline, iron, lasix, humalog, protonix.   Edema: 2+ BLE.   BM PTA.   Wound Type: Stage IV (sacrum)       Current Nutrition Intake & Therapies:    Average Meal Intake: Unable to assess  Average Supplements Intake: None Ordered  ADULT DIET; Regular; 3 carb choices (45 gm/meal); Low Fat/Low Chol/High Fiber/2 gm Na  ADULT ORAL NUTRITION SUPPLEMENT; Breakfast, Dinner; Diabetic Oral Supplement    Anthropometric Measures:  Height: 167.6 cm (5' 6\")  Ideal Body Weight (IBW): 142 lbs (65 kg)       Current Body Weight: 69.6 kg (153 lb 7 oz), 108.1 % IBW. Weight Source: Bed Scale  Current BMI (kg/m2): 24.8        Weight Adjustment For: No Adjustment                 BMI Categories: Normal Weight (BMI 22.0 to 24.9) age over 65    Estimated Daily Nutrient Needs:  Energy Requirements Based On: Formula  Weight Used for Energy Requirements: Current  Energy (kcal/day): 1689 
Nursing contacted Nocturnist/cross cover provider and notified patient seeing people moving around in the room, as reported to nurse by PCT. Pt is in the room alone and on droplet precautions. No acute findings reported. No other concerns reported. VSS. Ordered melatonin hs prn, as likely developing some hospital delirium, no acute focal deficits or any other concerning findings reported. Patient denies any further complaints or concerns. No acute distress reported. Nursing to notify Hospitalist for further/continued concerns. Will remain available overnight for further concerns if nursing/patient needs. Will defer further evaluation/management to the day shift primary care team.    Non-billable note.     
Orders received, chart reviewed and patient evaluated by physical therapy. Pending progression with skilled acute physical therapy, recommend:  Therapy 2 days/week in the home and also see \"other factors to consider\" below for additional discharge concerns/needs    Recommend with nursing OOB to chair 3x/day and walking daily with x 1 CGA and  gait belt . Thank you for completing as able in order to maintain patient strength, endurance and independence.     Full evaluation to follow.      Karrie Moreno, PT, DPT     
Papers/EMS   Sig: Place 1 patch onto the skin daily 12 hours on, 12 hours off.   lovastatin (MEVACOR) 20 MG tablet 2/8/2024 at 2100 Family Member, Transfer Papers/EMS   Sig: Take 1 tablet by mouth nightly   metoprolol succinate (TOPROL XL) 25 MG extended release tablet 2/8/2024 Family Member, Transfer Papers/EMS   Sig: Take 0.5 tablets by mouth daily   nitroGLYCERIN (NITROSTAT) 0.4 MG SL tablet Past Month Family Member, Transfer Papers/EMS   Sig: Place 1 tablet under the tongue every 5 minutes as needed for Chest pain (chest pain)   pantoprazole (PROTONIX) 40 MG tablet 2/8/2024 Family Member, Transfer Papers/EMS   Sig: Take 1 tablet by mouth in the morning and 1 tablet in the evening.   polyethylene glycol (GLYCOLAX) 17 GM/SCOOP powder Past Month Family Member, Transfer Papers/EMS   Sig: Take 17 g by mouth daily as needed (Constipation)      Facility-Administered Medications: None        Thank you,  Dez Stroud RPH

## 2024-02-16 ENCOUNTER — HOME CARE VISIT (OUTPATIENT)
Facility: HOME HEALTH | Age: 89
End: 2024-02-16
Payer: MEDICARE

## 2024-02-16 VITALS
OXYGEN SATURATION: 98 % | HEART RATE: 67 BPM | SYSTOLIC BLOOD PRESSURE: 98 MMHG | BODY MASS INDEX: 21.28 KG/M2 | TEMPERATURE: 97 F | RESPIRATION RATE: 18 BRPM | DIASTOLIC BLOOD PRESSURE: 60 MMHG | WEIGHT: 131.8 LBS

## 2024-02-16 PROCEDURE — G0299 HHS/HOSPICE OF RN EA 15 MIN: HCPCS

## 2024-02-16 NOTE — HOME HEALTH
Reason for referral, [facility] SUMMARY of clinical condition: Jack Hobbs admitted to home care for pressure ulcer, respiratory failure, Covid. Nursing, Physical Therapy and Occupational Therapy needed for education on wound care, fall prevention, medication compliance.     Clinical Assessment/Skilled reason for admission to home health (What this means for the patient overall and need for ongoing skilled care): Jack Hobbs seen for GAYATRI following hospitalization 2/9 through 2/15 for acute respiratory failure, COVID positive 2/9, right pleural effusions. PMHx. includes recent rib fractures, acute on chronic systolic heart failure s/p pacemaker, pAfib on AC, bioprosthetic valve, HTN,  chronic pressure ulcer to sacrum, hx. pacemaker lead infection. DM, HLD. Son noted patient had difficulty sleeping with wheezing, and stated he was needing to prop patient up with extra pillows. Per documentation, home health PT noted pulse ox to be 88 on room air while resting. During hospitalization, he had an echocardiogram which showed ejection fraction of 10 to 15%. Son reports this is a significant change from echocardiogram from January which showed an EF of 25%. Cardiology recommended discharging on Lasix 40mg daily and following up outpatient. Patient was started on Paxlovid for Covid infection. CG reports patient had trouble staying awake the last 3 days in hospital, and has been sleeping a lot since being home. CG was told about home health [facility] program due to multiple recent hospitalizations. CG stated they are already having home health visits several days per week, and we are already addressing any concerning symptoms, he's concerned adding an extra visit with [facility] may be redundant, but will see how patient does the first week home. CG states his goal is to keep patient as comfortable and pain free as possible. Cardiologist office was called during visit due to major interaction noted between Paxlovid and Eliquis as well as

## 2024-02-19 ENCOUNTER — HOME CARE VISIT (OUTPATIENT)
Facility: HOME HEALTH | Age: 89
End: 2024-02-19
Payer: MEDICARE

## 2024-02-19 ENCOUNTER — HOME CARE VISIT (OUTPATIENT)
Dept: HOME HEALTH SERVICES | Facility: HOME HEALTH | Age: 89
End: 2024-02-19
Payer: MEDICARE

## 2024-02-19 PROCEDURE — G0151 HHCP-SERV OF PT,EA 15 MIN: HCPCS

## 2024-02-20 ENCOUNTER — HOME CARE VISIT (OUTPATIENT)
Facility: HOME HEALTH | Age: 89
End: 2024-02-20
Payer: MEDICARE

## 2024-02-20 VITALS
TEMPERATURE: 97.6 F | OXYGEN SATURATION: 96 % | HEART RATE: 66 BPM | RESPIRATION RATE: 18 BRPM | DIASTOLIC BLOOD PRESSURE: 50 MMHG | SYSTOLIC BLOOD PRESSURE: 90 MMHG

## 2024-02-20 PROCEDURE — G0152 HHCP-SERV OF OT,EA 15 MIN: HCPCS

## 2024-02-20 ASSESSMENT — ENCOUNTER SYMPTOMS: PAIN LOCATION - PAIN QUALITY: SORE

## 2024-02-20 NOTE — HOME HEALTH
Reason for referral, [facility] SUMMARY of clinical condition: cov id. Patient  admitted to home care for PT. Physical Therapy needed for le strength training, transfer training, gait training, fall prevention and caregiver education.     Clinical Assessment/Skilled reason for admission to home health (What this means for the patient overall and need for ongoing skilled care):Patient is a 89 y o male who was hospitalized on 2/9 to 2/15 due to Acute respiratory failure, Acute on chronic systolic heart failure, s/p pacemaker, R pleural effusion chronic, COVID positive  Patient lives in 1 level home with 4 entrance steps. son provides 24 hour care. patient recently hospitalized and went to snf due to fall with rib fractures.  patient present very unsteady on his feet today. He does have dropping bp in standing. he has appointment with Dr David later today. Wrote down BP readings during visit and encouraged son to provide to MD. concerns that his blood pressure is too low. son reports 11 pound weight loss compared to pre hospital stay  Feel patient will benefit from home PT to address fall prevention, le strength training, transfer training, gait training, standing balance retraining and caregiver education. discussed PT POC and visit frequency. son requesting Zofia garcia for treatment    Diagnosis: Acute respiratory failure, Acute on chronic systolic heart failure, s/p pacemaker, R pleural effusion chronic, COVID positive    Subjective (statement from pt/cg that is relative to why you are there): I am dizzy    Caregiver: son.  Caregiver assists with: Medications, Meals, Bathing, ADL, IADL, Transportation and Housekeeping Caregiver unable to assist with: na. Caregiver is available 24 hours/day Caregiver is  present at this visit and did participate with clinician.    Medications reconciled and all medications are available in the home this visit. The following education was provided regarding medications: medication

## 2024-02-21 ENCOUNTER — HOME CARE VISIT (OUTPATIENT)
Facility: HOME HEALTH | Age: 89
End: 2024-02-21
Payer: MEDICARE

## 2024-02-21 VITALS
OXYGEN SATURATION: 96 % | SYSTOLIC BLOOD PRESSURE: 118 MMHG | TEMPERATURE: 98 F | DIASTOLIC BLOOD PRESSURE: 68 MMHG | HEART RATE: 80 BPM

## 2024-02-21 PROCEDURE — G0157 HHC PT ASSISTANT EA 15: HCPCS

## 2024-02-21 PROCEDURE — G0300 HHS/HOSPICE OF LPN EA 15 MIN: HCPCS

## 2024-02-21 NOTE — HOME HEALTH
next visit: ADL training, home safety.   Modified Barthel:   Feeding 10   Bathing 0   Grooming   Dressing 0 5   Bowels 10   Bladder 0 5   Toilet Use 0 5   Transfer 0 5   Mobility 0 5   Stairs 0   45/100

## 2024-02-21 NOTE — HOME HEALTH
Subjective: Son reporting wound is looking a little better.  Falls since last visit No (if yes complete the Fall Tracking Form and include bsrifallreport):   Caregiver involvement changes: son and daugther in law present for visit  Home health supplies by type and quantity ordered/delivered this visit include: n/a    Clinician asked if patient has had any physician contact since last home care visit and patient states: NO  Clinician asked if patient has any new or changed medications and patient states:  NO   If Yes, were medications reconciled? N/A   Was the certifying physician notified of changes in medications? N/A     Clinical assessment (what this visit means for the patient overall and need for ongoing skilled care) and progress or lack of progress towards SPECIFIC goals: Pt weak with amb requiring use of gait belt for safety, instructed son on how to don properly for proper guarding.  Pt instructed to have A for all amb at this time.    Written Teaching Material Utilized: written HEP in home    Interdisciplinary communication with: Norma Crane LPN for the purpose of POC collaboration    Discharge planning as follows: Will discharge when the patient has reached their maximum functional potential and maximum safety in their home    Specific plan for next visit: progress strengthening to standing position

## 2024-02-22 ENCOUNTER — HOME CARE VISIT (OUTPATIENT)
Facility: HOME HEALTH | Age: 89
End: 2024-02-22
Payer: MEDICARE

## 2024-02-22 PROCEDURE — G0156 HHCP-SVS OF AIDE,EA 15 MIN: HCPCS

## 2024-02-22 NOTE — TELEPHONE ENCOUNTER
Pts son is calling needing to schedule an appointment for an infection on his pacemaker leads.    Pt was put Doxycycline in [place] and every Dr's here in [place] is questioning that    Pt son is requesting a call back [phone]

## 2024-02-22 NOTE — TELEPHONE ENCOUNTER
Spoke with patient son requesting an office visit to see Dr Nuno for  an infection on his pacemaker leads.     Pt was put Doxycycline in [place] and every Dr's here in [place] is questioning that     Pt son is requesting a call back [phone]. Advised that need office notes and labs as to by patient is receiving antibiotics and advised that there is a 3 to 6 months wait time to see new patients

## 2024-02-23 ENCOUNTER — HOME CARE VISIT (OUTPATIENT)
Facility: HOME HEALTH | Age: 89
End: 2024-02-23
Payer: MEDICARE

## 2024-02-23 VITALS
WEIGHT: 138 LBS | BODY MASS INDEX: 22.28 KG/M2 | DIASTOLIC BLOOD PRESSURE: 50 MMHG | SYSTOLIC BLOOD PRESSURE: 110 MMHG | TEMPERATURE: 97.6 F | OXYGEN SATURATION: 100 %

## 2024-02-23 VITALS
RESPIRATION RATE: 18 BRPM | WEIGHT: 138.4 LBS | DIASTOLIC BLOOD PRESSURE: 50 MMHG | TEMPERATURE: 98.4 F | OXYGEN SATURATION: 100 % | BODY MASS INDEX: 22.35 KG/M2 | SYSTOLIC BLOOD PRESSURE: 110 MMHG | HEART RATE: 80 BPM

## 2024-02-23 VITALS
SYSTOLIC BLOOD PRESSURE: 120 MMHG | OXYGEN SATURATION: 96 % | TEMPERATURE: 98.9 F | DIASTOLIC BLOOD PRESSURE: 68 MMHG | RESPIRATION RATE: 18 BRPM | HEART RATE: 75 BPM

## 2024-02-23 PROCEDURE — G0299 HHS/HOSPICE OF RN EA 15 MIN: HCPCS

## 2024-02-23 PROCEDURE — G0157 HHC PT ASSISTANT EA 15: HCPCS

## 2024-02-23 ASSESSMENT — ENCOUNTER SYMPTOMS: HEMOPTYSIS: 0

## 2024-02-23 NOTE — HOME HEALTH
Subjective: Caregiver states its been a little overwhelming at times  Falls since last visit No(if yes complete the Fall Tracking Form and include bsrifallreport):   Caregiver involvement changes: Son and daughter in law are primary caregivers   Home health supplies by type and quantity ordered/delivered this visit include: none     Clinician asked if patient has had any physician contact since last home care visit and patient states: NO  Clinician asked if patient has any new or changed medications and patient states:  NO   If Yes, were medications reconciled? NO   Was the certifying physician notified of changes in medications? NO     Clinical assessment (what this visit means for the patient overall and need for ongoing skilled care) and progress or lack of progress towards SPECIFIC goals: Pt continues to require SN for wound care     Written Teaching Material Utilized: N/A    Interdisciplinary communication with: N/A for the purpose of NA     Discharge planning as follows: When wound is 100% healed    Specific plan for next visit: Wound care

## 2024-02-23 NOTE — HOME HEALTH
Subjective: Pt denies pain.  Falls since last visit No (if yes complete the Fall Tracking Form and include bsrifallreport):   Caregiver involvement changes: pt's son present for visit  Home health supplies by type and quantity ordered/delivered this visit include: n/a    Clinician asked if patient has had any physician contact since last home care visit and patient states: NO  Clinician asked if patient has any new or changed medications and patient states:  NO   If Yes, were medications reconciled? N/A   Was the certifying physician notified of changes in medications? N/A     Clinical assessment (what this visit means for the patient overall and need for ongoing skilled care) and progress or lack of progress towards SPECIFIC goals: Pt cont to tolerate progression of LE strengthening ex but requires frequent seated rest breaks due to SOB.  Pt amb with CGA from son for all household mobility at this time.    Written Teaching Material Utilized: written HEP in home    Interdisciplinary communication with: none at today's visit    Discharge planning as follows: Will discharge when the patient has reached their maximum functional potential and maximum safety in their home    Specific plan for next visit: progress strengthening and balance activities

## 2024-02-23 NOTE — HOME HEALTH
Subjective: \"I'm not having any pain.\"  Falls since last visit No(if yes complete the Fall Tracking Form and include bsrifallreport):   Caregiver involvement changes:   Home health supplies by type and quantity ordered/delivered this visit include: Supplies in the home    Clinician asked if patient has had any physician contact since last home care visit and patient states: NO  Clinician asked if patient has any new or changed medications and patient states:  NO   If Yes, were medications reconciled? N/A   Was the certifying physician notified of changes in medications? N/A     Clinical assessment (what this visit means for the patient overall and need for ongoing skilled care) and progress or lack of progress towards SPECIFIC goals: Pt has a stage 4 sacral wound that is progressing towards healing goal. D/t location pt would benefit from further HH visits for wound care. Pt is wearing a gait belt d/t to poor balance and uses a rolling wwalker around the house and outside of the home. Pt denies any pain. SN performed wound care, education, assessment and VS.   Next scheduled visit is Monday 2/26    Written Teaching Material Utilized: N/A    Interdisciplinary communication with: N/A     Discharge planning as follows: When wound is 100% healed and When goals are met    Specific plan for next visit: Wound care

## 2024-02-26 ENCOUNTER — TELEPHONE (OUTPATIENT)
Age: 89
End: 2024-02-26

## 2024-02-26 ENCOUNTER — HOME CARE VISIT (OUTPATIENT)
Facility: HOME HEALTH | Age: 89
End: 2024-02-26
Payer: MEDICARE

## 2024-02-26 VITALS
WEIGHT: 141.6 LBS | SYSTOLIC BLOOD PRESSURE: 126 MMHG | TEMPERATURE: 98.3 F | DIASTOLIC BLOOD PRESSURE: 64 MMHG | BODY MASS INDEX: 22.87 KG/M2 | RESPIRATION RATE: 16 BRPM

## 2024-02-26 PROCEDURE — G0299 HHS/HOSPICE OF RN EA 15 MIN: HCPCS

## 2024-02-26 PROCEDURE — G0157 HHC PT ASSISTANT EA 15: HCPCS

## 2024-02-26 PROCEDURE — G0156 HHCP-SVS OF AIDE,EA 15 MIN: HCPCS

## 2024-02-26 NOTE — HOME HEALTH
Subjective: I am doing ok.  Falls since last visit No (if yes complete the Fall Tracking Form and include bsrifallreport):   Caregiver involvement changes: pt's son present for visit  Home health supplies by type and quantity ordered/delivered this visit include: n/a    Clinician asked if patient has had any physician contact since last home care visit and patient states: NO  Clinician asked if patient has any new or changed medications and patient states:  NO   If Yes, were medications reconciled? N/A   Was the certifying physician notified of changes in medications? N/A     Clinical assessment (what this visit means for the patient overall and need for ongoing skilled care) and progress or lack of progress towards SPECIFIC goals: Pt demonstrating improvement in stability when amb with AD requiring CGA for safety without LOB as he turns corners in home.  Pt still requires frequent seated rest breaks due to low endurance and further training required for balance re-ed as pt unable to amb with S at this time.    Written Teaching Material Utilized: written HEP in home    Interdisciplinary communication with: none at today's visit    Discharge planning as follows: Will discharge when the patient has reached their maximum functional potential and maximum safety in their home    Specific plan for next visit: standing balance, gait training, bed mobility

## 2024-02-26 NOTE — TELEPHONE ENCOUNTER
Please request office notes and culture reports.  Once available please place on my desk.  Please give patient a heads up that appointment will probably be   2 to 3 months out

## 2024-02-26 NOTE — HOME HEALTH
Subjective: Patient states no pain today  Falls since last visit No(if yes complete the Fall Tracking Form and include bsrifallreport):   Caregiver involvement changes: no  Home health supplies by type and quantity ordered/delivered this visit include: no    Clinician asked if patient has had any physician contact since last home care visit and patient states: N/A  Clinician asked if patient has any new or changed medications and patient states:  N/A   If Yes, were medications reconciled? N/A   Was the certifying physician notified of changes in medications? N/A     Clinical assessment (what this visit means for the patient overall and need for ongoing skilled care) and progress or lack of progress towards SPECIFIC goals: Patients wound care is complete. New pics and measurements in chart. SN needed for further eval and treatment.     Written Teaching Material Utilized: N/A    Interdisciplinary communication with: N/A     Discharge planning as follows: When goals are met    Specific plan for next visit: Instruct in safety issues regarding Tripping hazards

## 2024-02-27 VITALS
HEART RATE: 70 BPM | TEMPERATURE: 97.9 F | BODY MASS INDEX: 22.87 KG/M2 | WEIGHT: 141.6 LBS | SYSTOLIC BLOOD PRESSURE: 110 MMHG | DIASTOLIC BLOOD PRESSURE: 60 MMHG | OXYGEN SATURATION: 96 %

## 2024-02-28 ENCOUNTER — HOME CARE VISIT (OUTPATIENT)
Facility: HOME HEALTH | Age: 89
End: 2024-02-28
Payer: MEDICARE

## 2024-02-28 VITALS
RESPIRATION RATE: 16 BRPM | SYSTOLIC BLOOD PRESSURE: 118 MMHG | WEIGHT: 143 LBS | TEMPERATURE: 97.8 F | HEART RATE: 75 BPM | OXYGEN SATURATION: 98 % | DIASTOLIC BLOOD PRESSURE: 60 MMHG | BODY MASS INDEX: 23.09 KG/M2

## 2024-02-28 PROCEDURE — G0299 HHS/HOSPICE OF RN EA 15 MIN: HCPCS

## 2024-02-28 PROCEDURE — G0156 HHCP-SVS OF AIDE,EA 15 MIN: HCPCS

## 2024-02-28 NOTE — TELEPHONE ENCOUNTER
Spoke with patient son ok to speak with. Advised that office notes and culture reports are needed . Aware of time frame to see new patients

## 2024-02-29 ENCOUNTER — HOME CARE VISIT (OUTPATIENT)
Facility: HOME HEALTH | Age: 89
End: 2024-02-29
Payer: MEDICARE

## 2024-02-29 ENCOUNTER — HOME CARE VISIT (OUTPATIENT)
Dept: HOME HEALTH SERVICES | Facility: HOME HEALTH | Age: 89
End: 2024-02-29
Payer: MEDICARE

## 2024-02-29 VITALS
HEART RATE: 79 BPM | SYSTOLIC BLOOD PRESSURE: 108 MMHG | DIASTOLIC BLOOD PRESSURE: 68 MMHG | TEMPERATURE: 98 F | OXYGEN SATURATION: 98 %

## 2024-02-29 PROCEDURE — G0157 HHC PT ASSISTANT EA 15: HCPCS

## 2024-02-29 PROCEDURE — G0152 HHCP-SERV OF OT,EA 15 MIN: HCPCS

## 2024-02-29 NOTE — HOME HEALTH
Subjective:Patient denies pain today  Falls since last visit No(if yes complete the Fall Tracking Form and include bsrifallreport):   Caregiver involvement changes: no  Home health supplies by type and quantity ordered/delivered this visit include: no    Clinician asked if patient has had any physician contact since last home care visit and patient states: N/A  Clinician asked if patient has any new or changed medications and patient states:  N/A   If Yes, were medications reconciled? NO   Was the certifying physician notified of changes in medications? N/A     Clinical assessment (what this visit means for the patient overall and need for ongoing skilled care) and progress or lack of progress towards SPECIFIC goals: Patients wound care is complete and pt looks as though he is feeling much better. SN needed for further eval and treatment.    Written Teaching Material Utilized: N/A    Interdisciplinary communication with: N/A     Discharge planning as follows: When goals are met    Specific plan for next visit: Educate in s/s of infection and when to call MD VIJAY or [phone]

## 2024-02-29 NOTE — HOME HEALTH
Subjective:  Cg reports 5 pound weight gain in recent week, increased SOB and wheezing. Calls have been placed to PCP and cardiology, awaiting return call     Falls since last visit (if yes complete the Fall Tracking Form and include .bsrifallreport):denies     Caregiver involvement changes: No changes reported or noted by clinician   Home health supplies by type and quantity ordered/delivered this visit include: N/A for OT      Clinician asked if patient has had any physician contact since last home care visit and patient states: Pt/cg deny any new physician contact     Clinician asked if patient has any new or changed medications and patient states: Pt/cg deny any med changes       If Yes, were medications reconciled? N/A      Was the certifying physician notified of changes in medications? N/A     Clinical assessment (what this visit means for the patient overall and need for ongoing skilled care) and progress/lack of progress towards SPECIFIC goals):  Pt wiht decreased therapy tolerance today. Pt with weight gain, increased SOB, vitals are WNL even with exertion. .MD (pcp and cardiology) have been made aware and son is aware of [facility] vs EMS if symptoms do no improve, worsen, persists      Written Teaching Material Utilized: [facility] info     Interdisciplinary communication with: SN, PT    Discharge planning as follows: once OT goals are met     Specific plan for next visit:HEP

## 2024-03-01 ENCOUNTER — HOME CARE VISIT (OUTPATIENT)
Age: 89
End: 2024-03-01
Payer: MEDICARE

## 2024-03-01 VITALS — SYSTOLIC BLOOD PRESSURE: 140 MMHG | HEART RATE: 62 BPM | DIASTOLIC BLOOD PRESSURE: 85 MMHG | RESPIRATION RATE: 18 BRPM

## 2024-03-01 PROCEDURE — G0299 HHS/HOSPICE OF RN EA 15 MIN: HCPCS

## 2024-03-01 ASSESSMENT — ENCOUNTER SYMPTOMS
CONSTIPATION: 1
DYSPNEA ACTIVITY LEVEL: AFTER AMBULATING 10 - 20 FT
PAIN LOCATION - PAIN QUALITY: SHARP

## 2024-03-02 ENCOUNTER — HOME CARE VISIT (OUTPATIENT)
Facility: HOME HEALTH | Age: 89
End: 2024-03-02
Payer: MEDICARE

## 2024-03-02 VITALS
TEMPERATURE: 97.7 F | OXYGEN SATURATION: 97 % | RESPIRATION RATE: 18 BRPM | DIASTOLIC BLOOD PRESSURE: 61 MMHG | SYSTOLIC BLOOD PRESSURE: 116 MMHG | HEART RATE: 80 BPM

## 2024-03-02 PROCEDURE — G0299 HHS/HOSPICE OF RN EA 15 MIN: HCPCS

## 2024-03-02 PROCEDURE — G0155 HHCP-SVS OF CSW,EA 15 MIN: HCPCS

## 2024-03-02 NOTE — HOSPICE
This LCSW conducted a home visit and completed the initial social work assessment with Mr. Jack Hobbs, his son and daughter-in-law. Mr. Hobbs identifies as Jain. He moved from [place], [place] in 2023 and is not currently affiliated with a community of janice. 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 He enjoys watching television especially old westerns. There are three dogs in the home. Mr. Hobbs has eight grandchildren and twenty great-grandchildren. He had a second child who is now . He has two siblings with whom he speaks to daily by phone. There are no financial concerns. The family has chosen [facility] to assist with patient's remains getting back to [facility] in [place] when the patient dies. The family has a good support network of friends.

## 2024-03-03 ENCOUNTER — HOME CARE VISIT (OUTPATIENT)
Dept: HOME HEALTH SERVICES | Facility: HOME HEALTH | Age: 89
End: 2024-03-03
Payer: MEDICARE

## 2024-03-03 VITALS
OXYGEN SATURATION: 95 % | DIASTOLIC BLOOD PRESSURE: 76 MMHG | SYSTOLIC BLOOD PRESSURE: 118 MMHG | HEART RATE: 70 BPM | TEMPERATURE: 97.8 F

## 2024-03-04 ENCOUNTER — HOME CARE VISIT (OUTPATIENT)
Age: 89
End: 2024-03-04
Payer: MEDICARE

## 2024-03-04 ENCOUNTER — HOME CARE VISIT (OUTPATIENT)
Facility: HOME HEALTH | Age: 89
End: 2024-03-04
Payer: MEDICARE

## 2024-03-04 VITALS — OXYGEN SATURATION: 93 % | HEART RATE: 55 BPM

## 2024-03-04 PROCEDURE — G0155 HHCP-SVS OF CSW,EA 15 MIN: HCPCS

## 2024-03-04 PROCEDURE — G0156 HHCP-SVS OF AIDE,EA 15 MIN: HCPCS

## 2024-03-04 ASSESSMENT — ENCOUNTER SYMPTOMS: DYSPNEA ACTIVITY LEVEL: AFTER AMBULATING LESS THAN 10 FT

## 2024-03-04 NOTE — HOSPICE
PRN visit completed s/p report of increased lethargy, audible secretions, 'rattle' per son.  Nebulizer treatment had been administered prior to visit with good tolerance and effect. Lungs diminished/minimal wheezing to BOBBY  Reviewed SRK medications/oxygen/ positional changes/turning to promote drainage.  DNR signed by Jack after full explanation of the potential scenario if his decission was to be a full code. Scanned for MD signature, copy for home required.    CM to bring to visit scheduled 4/5/24  Lasix po of 60mg qd to be reviewed at visit

## 2024-03-05 ENCOUNTER — HOME CARE VISIT (OUTPATIENT)
Age: 89
End: 2024-03-05
Payer: MEDICARE

## 2024-03-05 ENCOUNTER — HOME CARE VISIT (OUTPATIENT)
Facility: HOME HEALTH | Age: 89
End: 2024-03-05
Payer: MEDICARE

## 2024-03-05 VITALS
RESPIRATION RATE: 14 BRPM | SYSTOLIC BLOOD PRESSURE: 138 MMHG | DIASTOLIC BLOOD PRESSURE: 62 MMHG | TEMPERATURE: 97.4 F | HEART RATE: 80 BPM

## 2024-03-05 PROCEDURE — G0299 HHS/HOSPICE OF RN EA 15 MIN: HCPCS

## 2024-03-05 ASSESSMENT — ENCOUNTER SYMPTOMS
COUGH: 1
DYSPNEA ACTIVITY LEVEL: AFTER AMBULATING 10 - 20 FT
ORTHOPNEA: 1

## 2024-03-05 NOTE — HOSPICE
LMSW arrived at the patient's home to complete a routine visit for [facility]. The LMSW was greeted at the front door by the patient's DTR Fior. The home was clean and clutter free. The patient is a 89 y/o  male with a [facility] Dx. End stage heart failure. Patient was observed ambulating with physical assistance and a rollator from the living room to the kitchen. Patient was alert and oriented to self and place. Patient and son Butch provided information for this visit. Patient reported that his appetite was good. Patient can sleep through the night. Patient stated that his mood was better. Patient was a  in [facility]. Patient immigrated from [place] in [place]. Patient was  to Manuela for 27 years and then to Michaela who had passed in 2023. Patient had two sons, one who passed and Butch who the patient resides with. The patient has a brother Gregory who lives in [facility] [place]. Patient does not belong to a [employer] affiliation but identifies as a L[employer]. LMSW provided emotional support and supportive counseling related to EOL care.

## 2024-03-05 NOTE — HOME HEALTH
Missed visit, Caregiver states pt has MD appointment today and that he has already done the wound care.

## 2024-03-05 NOTE — HOSPICE
Patient was initially sitting in his recliner. He was awake and Ox4. He answered all questions appropriately. He ambulated with his rollator back to his room to assess his sacral area. He ambulates well without assistance. His coccyx wound is 1cm x 1.4cm. It is now a Stage III. Area cleaned with wound cleanser and dried. Collegen drsg applied and secured with foam dressing. Patient tolerated drsg change well. Patient is more short of breath when lying flat. He had no edema. His left lung was clear. Right lung had rhonchi. He states he is eating well. His last BM was 2  days ago. He denied any pain today. Patient has a very red area on his right second toe with a pinpoint open area on it. Family states it's an old sore. Encouraged patient and family to soak toe in Epson salt a couple times a day to help prevent an infection. V.S. 138/62; tep 97.4; pulse 80 irreg.; resp 14.  Routine medications ordered from [facility].

## 2024-03-06 ENCOUNTER — HOME CARE VISIT (OUTPATIENT)
Facility: HOME HEALTH | Age: 89
End: 2024-03-06
Payer: MEDICARE

## 2024-03-06 PROCEDURE — G0156 HHCP-SVS OF AIDE,EA 15 MIN: HCPCS

## 2024-03-06 NOTE — HOSPICE
Patient is sitting in recliner for initial visit, and his son and his wife, and her mother.  Patient looks well and is very verbal, patient is also eating and sleeping well. patient is from [facility].  Son and daughter in law are retired as well a wonderful family.  Patient provides empathic presence and prayer.  Will reach out to the patient and caregiver in the next 2 weeks.

## 2024-03-07 ENCOUNTER — HOME CARE VISIT (OUTPATIENT)
Facility: HOME HEALTH | Age: 89
End: 2024-03-07
Payer: MEDICARE

## 2024-03-07 PROCEDURE — G0299 HHS/HOSPICE OF RN EA 15 MIN: HCPCS

## 2024-03-08 VITALS
DIASTOLIC BLOOD PRESSURE: 60 MMHG | HEART RATE: 60 BPM | SYSTOLIC BLOOD PRESSURE: 102 MMHG | TEMPERATURE: 97.9 F | RESPIRATION RATE: 22 BRPM | OXYGEN SATURATION: 92 %

## 2024-03-08 ASSESSMENT — ENCOUNTER SYMPTOMS
COUGH CHARACTERISTICS: NON-PRODUCTIVE
COUGH: 1
DYSPNEA ACTIVITY LEVEL: AT REST

## 2024-03-08 NOTE — HOSPICE
PRN SN visit made after son Butch called in reporting patient had a difficult night with coughing and shortness of breath, having intiated supplemental oxygen as directed by Triage nurse.  Son Butch and daughter in law Stephany present.  Patient A&Ox4, calmly sitting in recliner, denies pain, has slightly elevated respiratory effort with shallow inspiration and increased effort, using supplemental oxygen at 2lpm.  Coughing has subsided through use of oxygen, lorazepam and more upright positioning.  Patient reports nasal dryness and irritation due to oxygen therapy.  Author added water to bubbler and provided teaching in maintaining water level as well as inspection and emptying of water trap. Saline gel and Lorazepam oral solution ordered via [facility] for next day delivery.  Reviewed use of oxygen positioning  Lorazepam and Hydromorphone for management of breathing exacerbation.  Encouraged patient and family to call with any changes or needs.    NEW MEDICATION INITIATION DOCUMENTATION:  Consulted AT MD to report change in patient status: yes  Obtained Order from Provider for initiation of Symptom relief medication / other medication needed:yes   Documentation completed in Telephone/Visit Note in [facility]  Reason medication is being initiated:symptom  MD / Provider name consulted re: change in status / initiation of new medication:Stephany Santos NP  New Symptom(s): shortness of breath, restlessness  New Order(s): Lorazepam 2mg/ml oral solution - 0.25ml/0.5ng every 3 hours as needed for shortness of breath, restlessness or anxiety  Name of person being taught: Jack Hobbs Butch & Stephany Hobbs  Instructions given: yes  Side Effects taught:yes  Response to teaching: verbalized understanding    NEW MEDICATION INITIATION DOCUMENTATION:  Consulted AT MD to report change in patient status: yes  Obtained Order from Provider for initiation of Symptom relief medication / other medication needed:yes   Documentation completed

## 2024-03-10 ENCOUNTER — HOME CARE VISIT (OUTPATIENT)
Age: 89
End: 2024-03-10
Payer: MEDICARE

## 2024-03-10 PROCEDURE — G0299 HHS/HOSPICE OF RN EA 15 MIN: HCPCS

## 2024-03-11 ENCOUNTER — HOME CARE VISIT (OUTPATIENT)
Facility: HOME HEALTH | Age: 89
End: 2024-03-11
Payer: MEDICARE

## 2024-03-11 VITALS
DIASTOLIC BLOOD PRESSURE: 70 MMHG | HEART RATE: 80 BPM | TEMPERATURE: 98.6 F | RESPIRATION RATE: 22 BRPM | OXYGEN SATURATION: 100 % | SYSTOLIC BLOOD PRESSURE: 120 MMHG

## 2024-03-11 VITALS
OXYGEN SATURATION: 99 % | HEART RATE: 76 BPM | RESPIRATION RATE: 20 BRPM | SYSTOLIC BLOOD PRESSURE: 106 MMHG | DIASTOLIC BLOOD PRESSURE: 54 MMHG

## 2024-03-11 PROCEDURE — G0156 HHCP-SVS OF AIDE,EA 15 MIN: HCPCS

## 2024-03-11 PROCEDURE — G0299 HHS/HOSPICE OF RN EA 15 MIN: HCPCS

## 2024-03-11 RX ADMIN — IPRATROPIUM BROMIDE AND ALBUTEROL SULFATE 1 VIAL: 2.5; .5 SOLUTION RESPIRATORY (INHALATION) at 09:30

## 2024-03-11 ASSESSMENT — ENCOUNTER SYMPTOMS
DYSPNEA ACTIVITY LEVEL: WHILE EATING
PAIN LOCATION - PAIN QUALITY: ACHY
CHEST TIGHTNESS: 1
DYSPNEA ACTIVITY LEVEL: AT REST

## 2024-03-11 NOTE — HOSPICE
Patient was just waking up at the beginning of the visit. He was Ox3 and answered questions appropriately. He denied any pain. Son stated he did not eat well last night. He stated the patient has been anxious due to his trouble breathing and was up late last night. Patient was given Hydromorphone and Lorazepam and finally fell asleep around midnight. Patient appears short of breath. He had rhonchi throughout his right lung and wheezing throughout both lungs. He stated he has a non-productive cough. Patient given a duo-neb tx. His lungs had louder wheezing after the neb tx. Son gave him a dose of lorazepam and hydromorphone before doing wound care on his coccyx. The small open area on his right 2nd toe was scabbed over. Skin prep applied to that area. Soaked pull-up removed and zinc cream applied to red sacrum and groin area. New pull-up put on and patient laid back down in bed. V.S. 120/70; pulse 80; O2 sat 100% on 2L; resp 22; temp 98.6  Lorazepam pills ordered from [facility].

## 2024-03-11 NOTE — HOSPICE
Called to see patient due to increased shortness of breath and tightness in chest with some chest pain. Patient was medicated at 7:45 PM and 10:45 PM with 1 mg Hydromorphone and .5 mg lorazepam with some relief but not complete relief. Assessment done. /54, pulse 76, respirations 20 and pulsox 99% on 2 L of O2 via NC. Lungs sound diminished bilaterally with some wheezing noted in right upper quadrant but no crackles or congestion noted. Phone call to Dr. Lira who approved increase in dosing of Hydromorphone and lorazepam. Lower doses were repeated at 11:45 PM and by 12:15 AM patient was feeling better and reported he thought he could sleep now.  Reviewed new dosing of hydromorphone 2 mg every hour as needed for shortness of breath or pain and lorazepam 1 mg every hour as needed for anxiety. It was suggested that they continue giving these two medication's together as this seems to work well for this patient.  We reviewed signs and symptoms of decline and the importance of keeping patient comfortable. I assisted in cleaning patients diaper area and putting on dry depends and repositioning for comfort. Patient and family appreciative of care. Offered support and encouraged them to call Hospice anytime with any questions or concerns.

## 2024-03-14 ENCOUNTER — HOME CARE VISIT (OUTPATIENT)
Facility: HOME HEALTH | Age: 89
End: 2024-03-14
Payer: MEDICARE

## 2024-03-14 PROCEDURE — G0156 HHCP-SVS OF AIDE,EA 15 MIN: HCPCS

## 2024-03-14 PROCEDURE — G0299 HHS/HOSPICE OF RN EA 15 MIN: HCPCS

## 2024-03-15 VITALS
RESPIRATION RATE: 28 BRPM | HEART RATE: 80 BPM | SYSTOLIC BLOOD PRESSURE: 108 MMHG | DIASTOLIC BLOOD PRESSURE: 60 MMHG | TEMPERATURE: 98 F | OXYGEN SATURATION: 97 %

## 2024-03-15 ASSESSMENT — ENCOUNTER SYMPTOMS
STOOL DESCRIPTION: SOFT
BOWEL INCONTINENCE: 1

## 2024-03-15 NOTE — HOSPICE
Patient ambulated with his walker from the kitchen table to his recliner. He had insp and exp. wheezing in all lung fields. His son had given him a Duo-neb tx an hour ago. Patient's feet were purple. He had 1-2+ pitting edema on both ankles. His right 2nd toe has an area of thick skin on top with a pinpoint open area. Skin prep applied to that area. Patient then ambulated with minimal assist and his walker to his bedroom to have the dressing changed on his sacrum. 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 See wound addenedum. V.S. 108/60; pulse 80; resp 28; O2 sat 97% on 2L;Temp 98.0

## 2024-03-16 ENCOUNTER — HOME CARE VISIT (OUTPATIENT)
Age: 89
End: 2024-03-16
Payer: MEDICARE

## 2024-03-19 NOTE — HOSPICE
Patient was sitting up in his recliner. He was awake and Ox3. His son had just turned his O2 off so patient could ambulate back to his room for wound care. Patient became increasingly short of breath quickly without the O2. Wound care was done quickly on this coccyx so he could return to his recliner and get back on O2. Son stated he had just had a Duo-neb tx a couple hours ago. His lower lobes had wet rhonchi. The right upper lobe was clear and the BOBBY had wheezing. Son stated the patient is eating 2 small meals a day and having daily bowel movements. Son states the patient is sleeping more and sleeps approx. 20 hours a day. V.S. 98/54; pulse 84; resp 20; temp 98.3.  Supplies ordered.

## 2024-03-22 NOTE — HOSPICE
Son had called hospice triage to say patient had increased dyspnea. Triage told son to give a dose of hydromorphone. On arrival for visit, patient was sitting up in his recliner. He had respirations of 30 with increased effort and abd muscle use. Patient had audible rhonchi and secretions in upper airway. Son had given hydromorphone at 8:30 and 9:40 and lorazepam at 8:30. O2 sat was 100% on 3L of O2. Wound care done quickly on patient's sacral area in the bedroom and he was taken back to recliner and placed back on O2. His O2 was 86%. He was given a HHN tx which increased his rhonchi and wheezing in all lung fields. Hyoscamine was given. Patient still stated \"I can't breath\". Hydromorphone 2mg and lorazepam 1mg given at 10:45. Patient then began to relax and breath easier. He was more relaxed by 1110. Son encouraged to call hospice if he can not get the patient comfortable on the present medication regimine.   Duo-nebs ordered from [facility] pharmacy for delivery. Patient has new bottle of hydromorphone and lorazepam in the home.

## 2024-03-23 NOTE — HOSPICE
Call received from [facility], reporting patient is more unresponsive and having foam come from his mouth. RN received patient lying supine in recliner. Eyes partially open, with no response on verbal interaction. Noted to have sputum dripping from mouth. Son administered 0.125 mg hyoscyamine SL tab prior to RN arrival. On assessment, patient utilizing accessory muscles with increased WOB, slight facial grimace noted, lungs with fluid throughout. Per son, patient has been receiving meds q1hr (2mg dilaudid and 1mg lorazepam). Discussed findings with Stephany Santos, NP and orders received to increase dilaudid to 4mg q1hr PRN and lorazepam to 2mg q1hr PRN with the goal for patient to be able to last longer between needing medications. 4mg dilaudid and 2mg lorazepam administered by patient's son. Within 15-20 minutes, respiratory effort relaxed and facial muscles relaxed. Patient repositioned in recliner with pillow. Advised family to rotate pillow every so often to help move secretions so they do not pool in patient's throat, resulting in a rattling sound. Son and DIL verbalized understanding of discussed interventions. Advised to reach out to hospice with any changes, questions, concerns. They had questions on timing of EOL. Advised that patient's pulses becoming weaker, and he is transitioning toward EOL. Discussed nature of cardiac disease and that sometimes something acute can happen which may lead to EOL occuring rapidly vs. a typical timeline that may be seen. They verbalized understanding and appreciate the support of hospice. Advised that a nurse would come out daily to evaluate patient and make sure his symptoms are being managed appropriately.

## 2024-03-23 NOTE — HOSPICE
Patient pronounced at 0125 following 2 minute auscultation of absent heart and lung sounds. Patient  in his recliner chair. Portmortem care deferred. Family grieving appropriately. Keo's FH to serve. DME notified.

## 2024-03-25 ENCOUNTER — HOME CARE VISIT (OUTPATIENT)
Age: 89
End: 2024-03-25
Payer: MEDICARE